# Patient Record
Sex: MALE | Race: WHITE | NOT HISPANIC OR LATINO | URBAN - METROPOLITAN AREA
[De-identification: names, ages, dates, MRNs, and addresses within clinical notes are randomized per-mention and may not be internally consistent; named-entity substitution may affect disease eponyms.]

---

## 2020-11-28 ENCOUNTER — EMERGENCY (EMERGENCY)
Facility: HOSPITAL | Age: 85
LOS: 1 days | Discharge: HOME | End: 2020-11-28
Attending: EMERGENCY MEDICINE
Payer: MEDICARE

## 2020-11-28 VITALS
OXYGEN SATURATION: 95 % | TEMPERATURE: 99 F | SYSTOLIC BLOOD PRESSURE: 138 MMHG | DIASTOLIC BLOOD PRESSURE: 66 MMHG | RESPIRATION RATE: 18 BRPM | HEART RATE: 78 BPM

## 2020-11-28 VITALS
RESPIRATION RATE: 20 BRPM | HEART RATE: 81 BPM | SYSTOLIC BLOOD PRESSURE: 122 MMHG | DIASTOLIC BLOOD PRESSURE: 70 MMHG | TEMPERATURE: 102 F | OXYGEN SATURATION: 95 %

## 2020-11-28 LAB
ALBUMIN SERPL ELPH-MCNC: 3.4 G/DL — SIGNIFICANT CHANGE UP (ref 3.3–5.2)
ALP SERPL-CCNC: 39 U/L — LOW (ref 40–120)
ALT FLD-CCNC: 25 U/L — SIGNIFICANT CHANGE UP
ANION GAP SERPL CALC-SCNC: 12 MMOL/L — SIGNIFICANT CHANGE UP (ref 5–17)
ANISOCYTOSIS BLD QL: SLIGHT — SIGNIFICANT CHANGE UP
APPEARANCE UR: CLEAR — SIGNIFICANT CHANGE UP
APTT BLD: 32.3 SEC — SIGNIFICANT CHANGE UP (ref 27.5–35.5)
AST SERPL-CCNC: 46 U/L — HIGH
B PERT DNA SPEC QL NAA+PROBE: SIGNIFICANT CHANGE UP
BACTERIA # UR AUTO: NEGATIVE — SIGNIFICANT CHANGE UP
BASOPHILS # BLD AUTO: 0.02 K/UL — SIGNIFICANT CHANGE UP (ref 0–0.2)
BASOPHILS NFR BLD AUTO: 0.9 % — SIGNIFICANT CHANGE UP (ref 0–2)
BILIRUB SERPL-MCNC: 0.3 MG/DL — LOW (ref 0.4–2)
BILIRUB UR-MCNC: NEGATIVE — SIGNIFICANT CHANGE UP
BUN SERPL-MCNC: 27 MG/DL — HIGH (ref 8–20)
C PNEUM DNA SPEC QL NAA+PROBE: SIGNIFICANT CHANGE UP
CALCIUM SERPL-MCNC: 8.4 MG/DL — LOW (ref 8.6–10.2)
CHLORIDE SERPL-SCNC: 102 MMOL/L — SIGNIFICANT CHANGE UP (ref 98–107)
CO2 SERPL-SCNC: 23 MMOL/L — SIGNIFICANT CHANGE UP (ref 22–29)
COLOR SPEC: YELLOW — SIGNIFICANT CHANGE UP
CREAT SERPL-MCNC: 0.94 MG/DL — SIGNIFICANT CHANGE UP (ref 0.5–1.3)
DIFF PNL FLD: ABNORMAL
ELLIPTOCYTES BLD QL SMEAR: SLIGHT — SIGNIFICANT CHANGE UP
EOSINOPHIL # BLD AUTO: 0 K/UL — SIGNIFICANT CHANGE UP (ref 0–0.5)
EOSINOPHIL NFR BLD AUTO: 0 % — SIGNIFICANT CHANGE UP (ref 0–6)
EPI CELLS # UR: SIGNIFICANT CHANGE UP
FLUAV H1 2009 PAND RNA SPEC QL NAA+PROBE: SIGNIFICANT CHANGE UP
FLUAV H1 RNA SPEC QL NAA+PROBE: SIGNIFICANT CHANGE UP
FLUAV H3 RNA SPEC QL NAA+PROBE: SIGNIFICANT CHANGE UP
FLUAV SUBTYP SPEC NAA+PROBE: SIGNIFICANT CHANGE UP
FLUBV RNA SPEC QL NAA+PROBE: SIGNIFICANT CHANGE UP
GIANT PLATELETS BLD QL SMEAR: PRESENT — SIGNIFICANT CHANGE UP
GLUCOSE SERPL-MCNC: 114 MG/DL — HIGH (ref 70–99)
GLUCOSE UR QL: NEGATIVE MG/DL — SIGNIFICANT CHANGE UP
HADV DNA SPEC QL NAA+PROBE: SIGNIFICANT CHANGE UP
HCOV PNL SPEC NAA+PROBE: SIGNIFICANT CHANGE UP
HCT VFR BLD CALC: 42 % — SIGNIFICANT CHANGE UP (ref 39–50)
HGB BLD-MCNC: 13.8 G/DL — SIGNIFICANT CHANGE UP (ref 13–17)
HMPV RNA SPEC QL NAA+PROBE: SIGNIFICANT CHANGE UP
HPIV1 RNA SPEC QL NAA+PROBE: SIGNIFICANT CHANGE UP
HPIV2 RNA SPEC QL NAA+PROBE: SIGNIFICANT CHANGE UP
HPIV3 RNA SPEC QL NAA+PROBE: SIGNIFICANT CHANGE UP
HPIV4 RNA SPEC QL NAA+PROBE: SIGNIFICANT CHANGE UP
INR BLD: 1.15 RATIO — SIGNIFICANT CHANGE UP (ref 0.88–1.16)
KETONES UR-MCNC: ABNORMAL
LACTATE BLDV-MCNC: 1.3 MMOL/L — SIGNIFICANT CHANGE UP (ref 0.5–2)
LEUKOCYTE ESTERASE UR-ACNC: ABNORMAL
LYMPHOCYTES # BLD AUTO: 0.46 K/UL — LOW (ref 1–3.3)
LYMPHOCYTES # BLD AUTO: 17.5 % — SIGNIFICANT CHANGE UP (ref 13–44)
MANUAL SMEAR VERIFICATION: SIGNIFICANT CHANGE UP
MCHC RBC-ENTMCNC: 28.2 PG — SIGNIFICANT CHANGE UP (ref 27–34)
MCHC RBC-ENTMCNC: 32.9 GM/DL — SIGNIFICANT CHANGE UP (ref 32–36)
MCV RBC AUTO: 85.9 FL — SIGNIFICANT CHANGE UP (ref 80–100)
MONOCYTES # BLD AUTO: 0.25 K/UL — SIGNIFICANT CHANGE UP (ref 0–0.9)
MONOCYTES NFR BLD AUTO: 9.6 % — SIGNIFICANT CHANGE UP (ref 2–14)
MYELOCYTES NFR BLD: 0.9 % — HIGH (ref 0–0)
NEUTROPHILS # BLD AUTO: 1.74 K/UL — LOW (ref 1.8–7.4)
NEUTROPHILS NFR BLD AUTO: 64.9 % — SIGNIFICANT CHANGE UP (ref 43–77)
NEUTS BAND # BLD: 0.9 % — SIGNIFICANT CHANGE UP (ref 0–8)
NITRITE UR-MCNC: NEGATIVE — SIGNIFICANT CHANGE UP
OVALOCYTES BLD QL SMEAR: SLIGHT — SIGNIFICANT CHANGE UP
PH UR: 5 — SIGNIFICANT CHANGE UP (ref 5–8)
PLAT MORPH BLD: NORMAL — SIGNIFICANT CHANGE UP
PLATELET # BLD AUTO: 88 K/UL — LOW (ref 150–400)
POIKILOCYTOSIS BLD QL AUTO: SLIGHT — SIGNIFICANT CHANGE UP
POTASSIUM SERPL-MCNC: 3.8 MMOL/L — SIGNIFICANT CHANGE UP (ref 3.5–5.3)
POTASSIUM SERPL-SCNC: 3.8 MMOL/L — SIGNIFICANT CHANGE UP (ref 3.5–5.3)
PROT SERPL-MCNC: 6.4 G/DL — LOW (ref 6.6–8.7)
PROT UR-MCNC: 100 MG/DL
PROTHROM AB SERPL-ACNC: 13.3 SEC — SIGNIFICANT CHANGE UP (ref 10.6–13.6)
RAPID RVP RESULT: DETECTED
RBC # BLD: 4.89 M/UL — SIGNIFICANT CHANGE UP (ref 4.2–5.8)
RBC # FLD: 13.2 % — SIGNIFICANT CHANGE UP (ref 10.3–14.5)
RBC BLD AUTO: ABNORMAL
RBC CASTS # UR COMP ASSIST: ABNORMAL /HPF (ref 0–4)
RV+EV RNA SPEC QL NAA+PROBE: SIGNIFICANT CHANGE UP
SARS-COV-2 RNA SPEC QL NAA+PROBE: DETECTED
SCHISTOCYTES BLD QL AUTO: SLIGHT — SIGNIFICANT CHANGE UP
SODIUM SERPL-SCNC: 137 MMOL/L — SIGNIFICANT CHANGE UP (ref 135–145)
SP GR SPEC: 1.02 — SIGNIFICANT CHANGE UP (ref 1.01–1.02)
UROBILINOGEN FLD QL: NEGATIVE MG/DL — SIGNIFICANT CHANGE UP
VARIANT LYMPHS # BLD: 5.3 % — SIGNIFICANT CHANGE UP (ref 0–6)
WBC # BLD: 2.65 K/UL — LOW (ref 3.8–10.5)
WBC # FLD AUTO: 2.65 K/UL — LOW (ref 3.8–10.5)
WBC UR QL: SIGNIFICANT CHANGE UP

## 2020-11-28 PROCEDURE — 93010 ELECTROCARDIOGRAM REPORT: CPT

## 2020-11-28 PROCEDURE — 85730 THROMBOPLASTIN TIME PARTIAL: CPT

## 2020-11-28 PROCEDURE — 70450 CT HEAD/BRAIN W/O DYE: CPT | Mod: 26

## 2020-11-28 PROCEDURE — 81001 URINALYSIS AUTO W/SCOPE: CPT

## 2020-11-28 PROCEDURE — 99284 EMERGENCY DEPT VISIT MOD MDM: CPT | Mod: 25

## 2020-11-28 PROCEDURE — 85610 PROTHROMBIN TIME: CPT

## 2020-11-28 PROCEDURE — 71045 X-RAY EXAM CHEST 1 VIEW: CPT

## 2020-11-28 PROCEDURE — 83605 ASSAY OF LACTIC ACID: CPT

## 2020-11-28 PROCEDURE — 99285 EMERGENCY DEPT VISIT HI MDM: CPT

## 2020-11-28 PROCEDURE — 93005 ELECTROCARDIOGRAM TRACING: CPT

## 2020-11-28 PROCEDURE — 82553 CREATINE MB FRACTION: CPT

## 2020-11-28 PROCEDURE — 80053 COMPREHEN METABOLIC PANEL: CPT

## 2020-11-28 PROCEDURE — 87040 BLOOD CULTURE FOR BACTERIA: CPT

## 2020-11-28 PROCEDURE — 70450 CT HEAD/BRAIN W/O DYE: CPT

## 2020-11-28 PROCEDURE — 85025 COMPLETE CBC W/AUTO DIFF WBC: CPT

## 2020-11-28 PROCEDURE — 36415 COLL VENOUS BLD VENIPUNCTURE: CPT

## 2020-11-28 PROCEDURE — 0225U NFCT DS DNA&RNA 21 SARSCOV2: CPT

## 2020-11-28 PROCEDURE — 87086 URINE CULTURE/COLONY COUNT: CPT

## 2020-11-28 PROCEDURE — 82550 ASSAY OF CK (CPK): CPT

## 2020-11-28 PROCEDURE — 71045 X-RAY EXAM CHEST 1 VIEW: CPT | Mod: 26

## 2020-11-28 RX ORDER — ACETAMINOPHEN 500 MG
975 TABLET ORAL ONCE
Refills: 0 | Status: COMPLETED | OUTPATIENT
Start: 2020-11-28 | End: 2020-11-28

## 2020-11-28 RX ORDER — SODIUM CHLORIDE 9 MG/ML
2400 INJECTION, SOLUTION INTRAVENOUS ONCE
Refills: 0 | Status: COMPLETED | OUTPATIENT
Start: 2020-11-28 | End: 2020-11-28

## 2020-11-28 RX ADMIN — Medication 975 MILLIGRAM(S): at 00:34

## 2020-11-28 RX ADMIN — SODIUM CHLORIDE 2400 MILLILITER(S): 9 INJECTION, SOLUTION INTRAVENOUS at 10:30

## 2020-11-28 NOTE — ED PROVIDER NOTE - PATIENT PORTAL LINK FT
You can access the FollowMyHealth Patient Portal offered by [facility] by registering at the following website: [email or web address]. By joining [facility] FollowMyHealth portal, you will also be able to view your health information using other applications (apps) compatible with our system.

## 2020-11-28 NOTE — ED PROVIDER NOTE - NSFOLLOWUPINSTRUCTIONS_ED_ALL_ED_FT
COVID-19 (Coronavirus Disease 2019)    WHAT YOU NEED TO KNOW:    COVID-19 is the disease caused by the novel (new) coronavirus first discovered in December 2019. Coronaviruses generally cause upper respiratory (nose, throat, and lung) infections, such as a cold. The new virus can also cause serious lower respiratory conditions, such as pneumonia or acute respiratory distress syndrome (ARDS). Anyone can develop serious problems from the new virus, but your risk is higher if you are 65 or older. A weak immune system, diabetes, or a heart or lung condition can also increase your risk.    DISCHARGE INSTRUCTIONS:    If you think you or someone you know may be infected: Do the following to protect others:   •If emergency care is needed, tell the  about the possible infection, or call ahead and tell the emergency department.      •Call a healthcare provider for instructions if symptoms are mild. Anyone who may be infected should not arrive without calling first. The provider will need to protect staff members and other patients.      •The person who may be infected needs to wear a face covering while getting medical care. This will help lower the risk of infecting others. Coverings are not used for anyone who is younger than 2 years, has breathing problems, or cannot remove it. The provider can give you instructions for anyone who cannot wear a covering.      Call your local emergency number (911 in the [place]) or go to the emergency department if:   •You have trouble breathing or shortness of breath at rest.      •You have chest pain or pressure that lasts longer than 5 minutes.      •You become confused or hard to wake.      •Your lips or face are blue.      •You have a fever of 104°F (40°C) or higher.      Call your doctor if:   •You do not have symptoms of COVID-19 but had close physical contact within 14 days with someone who tested positive.      •You have questions or concerns about your condition or care.      Medicines: You may need any of the following:   •Decongestants help reduce nasal congestion and help you breathe more easily. If you take decongestant pills, they may make you feel restless or cause problems with your sleep. Do not use decongestant sprays for more than a few days.      •Cough suppressants help reduce coughing. Ask your healthcare provider which type of cough medicine is best for you.      •Acetaminophen decreases pain and fever. It is available without a doctor's order. Ask how much to take and how often to take it. Follow directions. Read the labels of all other medicines you are using to see if they also contain acetaminophen, or ask your doctor or pharmacist. Acetaminophen can cause liver damage if not taken correctly. Do not use more than 4 grams (4,000 milligrams) total of acetaminophen in one day.       •NSAIDs, such as ibuprofen, help decrease swelling, pain, and fever. NSAIDs can cause stomach bleeding or kidney problems in certain people. If you take blood thinner medicine, always ask your healthcare provider if NSAIDs are safe for you. Always read the medicine label and follow directions.      •Take your medicine as directed. Contact your healthcare provider if you think your medicine is not helping or if you have side effects. Tell him or her if you are allergic to any medicine. Keep a list of the medicines, vitamins, and herbs you take. Include the amounts, and when and why you take them. Bring the list or the pill bottles to follow-up visits. Carry your medicine list with you in case of an emergency.      How the 2019 coronavirus spreads: The virus spreads quickly and easily. You can become infected if you are in contact with a large amount of the virus, even for a short time. You can also become infected by being around a small amount of virus for a long time. The following are ways the virus is thought to spread, but more information may be coming:   •Droplets are the most common way all coronaviruses spread. The virus can travel in droplets that form when a person talks, coughs, or sneezes. Anyone who breathes in the droplets or gets them in his or her eyes can become infected with the virus. Close personal contact with an infected person is thought to be the main way the virus spreads. Close personal contact means you are within 6 feet (2 meters) of the person.      •Person-to-person contact can spread the virus. For example, a person with the virus on his or her hands can spread it by shaking hands with someone. At this time, it does not appear that the virus can be passed to a baby during pregnancy or delivery. The baby can be infected after he or she is born through person-to-person contact. The virus also does not appear to spread in breast milk. If you are pregnant or breastfeeding, talk to your healthcare provider or obstetrician about any concerns you have.      •The virus can stay on objects and surfaces. A person can get the virus on his or her hands by touching the object or surface. Infection happens if the person then touches his or her eyes or mouth with unwashed hands. It is not yet known how long the virus can stay on an object or surface. That is why it is important to clean all surfaces that are used regularly.      •An infected animal may be able to infect a person who touches it. This may happen at live markets or on a farm.      How everyone can lower the risk for COVID-19: The best way to prevent infection is to avoid anyone who is infected, but this can be hard to do. An infected person can spread the virus before signs or symptoms begin, or even if signs or symptoms never develop. The following can help lower the risk for infection:     Limit the Spread of Infectious Disease     •Wash your hands often throughout the day. Use soap and water. Rub your soapy hands together, lacing your fingers. Wash the front and back of each hand, and in between your fingers. Use the fingers of one hand to scrub under the fingernails of the other hand. Wash for at least 20 seconds. Rinse with warm, running water for several seconds. Then dry your hands with a clean towel or paper towel. Use hand  that contains alcohol if soap and water are not available. Do not touch your eyes, nose, or mouth without washing your hands first. Teach children how to wash their hands and use hand .  Handwashing           •Cover a sneeze or cough. This prevents droplets from traveling from you to others. Turn your face away and cover your mouth and nose with a tissue. Throw the tissue away. Use the bend of your arm if a tissue is not available. Then wash your hands well with soap and water or use hand . Turn and cover your face if you are around someone who is sneezing or coughing. Teach children how to cover a cough or sneeze.      •Follow worldwide, national, and local social distancing guidelines. Social distancing means people avoid close physical contact so the virus cannot spread from one person to another. Keep at least 6 feet (2 meters) between you and others. Also keep this distance from anyone who comes to your home, such as someone making a delivery.      •Make a habit of not touching your face. It is not known how long the virus can stay on objects and surfaces. If you get the virus on your hands, you can transfer it to your eyes, nose, or mouth and become infected. You can also transfer it to objects, surfaces, or people. Be aware of what you touch when you go out. Examples include handrails and elevator buttons. Try not to touch anything with bare hands unless it is necessary. Wash your hands before you leave your home and when you return.      •Clean and disinfect high-touch surfaces and objects often. Use a disinfecting solution or wipes. You can make a solution by diluting 4 teaspoons of bleach in 1 quart (4 cups) of water. Clean and disinfect even if you think no one living in or coming to your home is infected with the virus. You can wipe items with a disinfecting cloth before you bring them into your home. Wash your hands after you handle anything you bring into your home.      •Make your immune system as healthy as possible. A weakened immune system makes you more vulnerable to the new coronavirus. No COVID-19 vaccine is available yet. Vaccines such as the flu and pneumonia vaccines can help your immune system. Your healthcare provider can tell you which vaccines to get, and when to get them. Keep your immune system as strong as possible. Do not smoke. Eat healthy foods, exercise regularly, and try to manage stress. Go to bed and wake up at the same times each day.   Healthy Foods           Social distancing guidelines: National and local social distancing rules vary. Rules may change over time as restrictions are lifted. Restrictions may return if an outbreak happens where you live. It is important to know and follow all current social distancing rules in your area. The following are general guidelines:  •Limit trips out of your home. You may be able to have food, medicines, and other supplies delivered. If possible, have delivered items left at your door or other area. Try not to have someone hand you an item. You will be so close to the person that the virus can spread between you.      •Do not have close physical contact with anyone who does not live in your home. Do not shake hands with, hug, or kiss a person as a greeting. Stand or walk as far from others as possible. If you must use public transportation (such as a bus or subway), try to sit or stand away from others. You can stay safely connected with others through phone calls, e-mail messages, social media websites, and video chats. Check in on anyone who may be having a hard time socially distancing, or who lives alone. Ask administrators at nursing homes or long-term care facilities how you can safely communicate with someone living there.      •Wear a cloth face covering around others who do not live in your home. Face coverings help prevent the virus from spreading to others in droplets. You can use a clear face covering if someone needs to read your lips. This is a cloth covering that has plastic over the mouth area so your lips can be seen. Do not use coverings that have breathing valves or vents. The virus can travel out of the valve or vent and be spread to others. Do not take your covering off to talk, cough, or sneeze. Do not use coverings on children younger than 2 years or on anyone who has breathing problems or cannot remove it.      •Only allow medical or other necessary professionals into your home. Wear your face covering, and remind professionals to wear a face covering. Remind them to wash their hands when they arrive and before they leave. Do not let anyone who does not live in your home in, even if the person is not sick. A person can pass the virus to others before symptoms of COVID-19 begin. Some people never even develop symptoms. Children commonly have mild symptoms or no symptoms. It may be hard to tell a child not to hug or kiss you. Explain that this is how he or she can help you stay healthy.      •Do not go to someone else's home unless it is necessary. Do not go over to visit, even if the person is lonely. Only go if you need to help him or her. Make sure you both wear face coverings while you are there.      •Avoid large gatherings and crowds. Gatherings or crowds of 10 or more individuals can cause the virus to spread. Examples of gatherings include parties, sporting events, [employer] services, and conferences. Crowds may form at beaches, huang, and tourist attractions. Protect yourself by staying away from large gatherings and crowds.      •Ask your healthcare provider for other ways to have appointments. You may be able to have appointments without having to go into the provider's office. Some providers offer phone, video, or other types of appointments. You may also be able to get prescriptions for a few months of your medicines at a time.      •Stay safe if you must go out to work. You may have a job that can only be done outside your home. Keep physical distance between you and other workers as much as possible. Follow your employer's rules so everyone stays safe.      If you have COVID-19 and are recovering at home: Healthcare providers will give you specific instructions to follow. The following are general guidelines to remind you how to keep others safe until you are well:   •Wash your hands often. Use soap and water as much as possible. You can use hand  that contains alcohol if soap and water are not available. Do not share towels with anyone. If you use paper towels, throw them away in a lined trash can kept in your room or area. Use a covered trash can, if possible.      •Do not go out of your home unless it is necessary. You may have to go to your healthcare provider's office for check-ups or to get prescription refills. Do not arrive at the provider's office without an appointment. Providers have to make their offices safe for staff and other patients.      •Do not have close physical contact with anyone unless it is necessary. Only have close physical contact with a person giving direct care, or a baby or child you must care for. Family members and friends should not visit you. If possible, stay in a separate area or room of your home if you live with others. No one should go into the area or room except to give you care. You can visit with others by phone, video chat, e-mail, or similar systems. It is important to stay connected with others in your life while you recover.      •Wear a face covering while others are near you. This can help prevent droplets from spreading the virus when you talk, sneeze, or cough. Put the covering on before anyone comes into your room or area. Remind the person to cover his or her nose and mouth before going in to provide care for you.      •Do not share items. Do not share dishes, towels, or other items with anyone. Items need to be washed after you use them.      •Protect your baby. Wash your hands with soap and water often throughout the day. Wear a clean face covering while you breastfeed, or while you express or pump breast milk. If possible, ask someone who is well to care for your baby. You can put breast milk in bottles for the person to use, if needed. Talk to your healthcare provider if you have any questions or concerns about caring for or bonding with your baby. He or she will tell you when to bring your baby in for check-ups and vaccines. He or she will also tell you what to do if you think your baby was infected with the new virus.      •Do not handle live animals. Until more is known, it is best not to touch, play with, or handle live animals. Some animals, including pets, have been infected with the new coronavirus. Do not handle or care for animals until you are well. Care includes feeding, petting, and cuddling your pet. Do not let your pet lick you or share your food. Ask someone who is not infected to take care of your pet, if possible. If you must care for a pet, wear a face covering. Wash your hands before and after you give care.      •Follow directions from your healthcare provider for being around others after you recover. You will need to wait at least 10 days after symptoms first appeared. Then you will need to have no fever for 24 hours without fever medicine, and no other symptoms. A loss of taste or smell may continue for several months. It is considered okay to be around others if this is your only symptom. It is not known for sure if or for how long a recovered person can pass the virus to others. Your provider may give you instructions, such as continuing social distancing or wearing a face covering around others.      How to take care of someone who has COVID-19: If the person lives in another home, arrange for a time to give care. Remember to bring a few pairs of disposable gloves and a cloth face covering. The following are general guidelines to help you safely care for anyone who has COVID-19:  •Wash your hands often. Wash before and after you go into the person's home, area, or room. Throw paper towels away in a lined trash can that has a lid, if possible.      •Do not allow others to go near the person. No one should come into the person's home unless it is necessary. If possible, the person should be in a separate area or room if he or she lives with others. Keep the room's door shut unless you need to go in or out. Have others call, video chat, or e-mail the person if he or she is feeling well enough. The person may feel lonely if he or she is kept separate for a long period of time. Safe communication can help him or her stay connected to family and friends.      •Make sure the person's room has good air flow. You may be able to open the window if the weather allows. An air conditioner can also be turned on to help air move.      •Contact the person before you go in to give care. Make sure the person is wearing a face covering. Remind him or her to wash his or her hands with soap and water. He or she can use hand  that contains alcohol if soap and water are not available. Put on a face covering before you go in to give care.      •Wear gloves while you give care and clean. Clean items the person uses often. Clean countertops, cooking surfaces, and the fronts and insides of the microwave and refrigerator. Clean the shower, toilet, the area around the toilet, the sink, the area around the sink, and faucets. Gather used laundry or bedding. Wash and dry items on the warmest settings the fabric allows. Wash dishes and silverware in hot, soapy water or in a .      •Anything you throw away needs to go into a lined trash can. When you need to empty the trash, close the open end of the lining and tie it closed. This helps prevent items the virus is on from spilling out of the trash. Remove your gloves and throw them away. Wash your hands.      Follow up with your doctor as directed: Write down your questions so you remember to ask them during your visits.

## 2020-11-28 NOTE — ED ADULT TRIAGE NOTE - CHIEF COMPLAINT QUOTE
pt BIBA from home c/o generalized weakness, body aches, and decreased oral intake x 1 week. Pts son denies any known medical history. Pt lives in home with covid + patient was due to get tested today. Denies any chest pain, SOB, cough

## 2020-11-28 NOTE — ED PROVIDER NOTE - OBJECTIVE STATEMENT
Son, Fidel - [phone] who provides most history  89 yo male denies PMHx p/w fever and malaise for several days; as per son, patient fell a few days ago, he found him down after several hours; since then, has had increasing malaise, intermittent confusion, poor appetite, sleeping "a lot"; denies fever; +sick contact with COVID in house; patient denies cough, SOB, or abd pain; just "everything hurts."

## 2020-11-28 NOTE — ED PROVIDER NOTE - PROGRESS NOTE DETAILS
all results reviewed; patient with unremarkable head CT and labs; clear xray; covid positive, but with normal sats; even with exertion walkking in hospital room, sats never dropped below 93% on Room Air; discussed with patient's son, ok for d/c home with him, he is home from work for another 8 days and can care for dad at home; advised on all appropriate PPE and mask use and isolation at home; precautions discussed for return; has O2 monitor available at home

## 2020-11-28 NOTE — ED ADULT NURSE REASSESSMENT NOTE - NS ED NURSE REASSESS COMMENT FT1
Son in the  emergency room waiting room , pt dressed up , IV removed - bleeding controlled, pt wheeled to waiting room , discharge instructions given to son, all questions answered, pt in not any distress at this time

## 2020-11-29 LAB
CULTURE RESULTS: SIGNIFICANT CHANGE UP
SPECIMEN SOURCE: SIGNIFICANT CHANGE UP

## 2020-12-02 ENCOUNTER — INPATIENT (INPATIENT)
Facility: HOSPITAL | Age: 85
LOS: 15 days | Discharge: HOME | DRG: 177 | End: 2020-12-18
Attending: HOSPITALIST | Admitting: INTERNAL MEDICINE
Payer: MEDICARE

## 2020-12-02 VITALS
WEIGHT: 175.05 LBS | RESPIRATION RATE: 32 BRPM | SYSTOLIC BLOOD PRESSURE: 137 MMHG | OXYGEN SATURATION: 89 % | DIASTOLIC BLOOD PRESSURE: 71 MMHG | HEIGHT: 72 IN | HEART RATE: 74 BPM

## 2020-12-02 DIAGNOSIS — U07.1 COVID-19: ICD-10-CM

## 2020-12-02 DIAGNOSIS — Z85.46 PERSONAL HISTORY OF MALIGNANT NEOPLASM OF PROSTATE: Chronic | ICD-10-CM

## 2020-12-02 LAB
ACANTHOCYTES BLD QL SMEAR: SIGNIFICANT CHANGE UP
ALBUMIN SERPL ELPH-MCNC: 3.1 G/DL — LOW (ref 3.3–5.2)
ALBUMIN SERPL ELPH-MCNC: 3.1 G/DL — LOW (ref 3.3–5.2)
ALP SERPL-CCNC: 38 U/L — LOW (ref 40–120)
ALP SERPL-CCNC: 43 U/L — SIGNIFICANT CHANGE UP (ref 40–120)
ALT FLD-CCNC: 42 U/L — HIGH
ALT FLD-CCNC: 44 U/L — HIGH
ANION GAP SERPL CALC-SCNC: 12 MMOL/L — SIGNIFICANT CHANGE UP (ref 5–17)
AST SERPL-CCNC: 77 U/L — HIGH
AST SERPL-CCNC: 85 U/L — HIGH
BASE EXCESS BLDA CALC-SCNC: 1.2 MMOL/L — SIGNIFICANT CHANGE UP (ref -2–2)
BASOPHILS # BLD AUTO: 0 K/UL — SIGNIFICANT CHANGE UP (ref 0–0.2)
BASOPHILS NFR BLD AUTO: 0 % — SIGNIFICANT CHANGE UP (ref 0–2)
BILIRUB DIRECT SERPL-MCNC: 0.2 MG/DL — SIGNIFICANT CHANGE UP (ref 0–0.3)
BILIRUB INDIRECT FLD-MCNC: 0.3 MG/DL — SIGNIFICANT CHANGE UP (ref 0.2–1)
BILIRUB SERPL-MCNC: 0.5 MG/DL — SIGNIFICANT CHANGE UP (ref 0.4–2)
BILIRUB SERPL-MCNC: 0.5 MG/DL — SIGNIFICANT CHANGE UP (ref 0.4–2)
BLD GP AB SCN SERPL QL: SIGNIFICANT CHANGE UP
BLOOD GAS COMMENTS ARTERIAL: SIGNIFICANT CHANGE UP
BUN SERPL-MCNC: 25 MG/DL — HIGH (ref 8–20)
CALCIUM SERPL-MCNC: 8.4 MG/DL — LOW (ref 8.6–10.2)
CHLORIDE SERPL-SCNC: 97 MMOL/L — LOW (ref 98–107)
CO2 SERPL-SCNC: 24 MMOL/L — SIGNIFICANT CHANGE UP (ref 22–29)
CREAT SERPL-MCNC: 0.82 MG/DL — SIGNIFICANT CHANGE UP (ref 0.5–1.3)
CREAT SERPL-MCNC: 0.85 MG/DL — SIGNIFICANT CHANGE UP (ref 0.5–1.3)
CRP SERPL-MCNC: 7.39 MG/DL — HIGH (ref 0–0.4)
CRP SERPL-MCNC: 9.45 MG/DL — HIGH (ref 0–0.4)
D DIMER BLD IA.RAPID-MCNC: 267 NG/ML DDU — HIGH
ELLIPTOCYTES BLD QL SMEAR: SLIGHT — SIGNIFICANT CHANGE UP
EOSINOPHIL # BLD AUTO: 0.09 K/UL — SIGNIFICANT CHANGE UP (ref 0–0.5)
EOSINOPHIL NFR BLD AUTO: 1.7 % — SIGNIFICANT CHANGE UP (ref 0–6)
FERRITIN SERPL-MCNC: 649 NG/ML — HIGH (ref 30–400)
FERRITIN SERPL-MCNC: 710 NG/ML — HIGH (ref 30–400)
GAS PNL BLDA: SIGNIFICANT CHANGE UP
GIANT PLATELETS BLD QL SMEAR: PRESENT — SIGNIFICANT CHANGE UP
GLUCOSE SERPL-MCNC: 104 MG/DL — HIGH (ref 70–99)
HCO3 BLDA-SCNC: 25 MMOL/L — SIGNIFICANT CHANGE UP (ref 20–26)
HCT VFR BLD CALC: 43.6 % — SIGNIFICANT CHANGE UP (ref 39–50)
HGB BLD-MCNC: 14.8 G/DL — SIGNIFICANT CHANGE UP (ref 13–17)
HOROWITZ INDEX BLDA+IHG-RTO: SIGNIFICANT CHANGE UP
LDH SERPL L TO P-CCNC: 547 U/L — HIGH (ref 98–192)
LYMPHOCYTES # BLD AUTO: 0.31 K/UL — LOW (ref 1–3.3)
LYMPHOCYTES # BLD AUTO: 6.1 % — LOW (ref 13–44)
MANUAL SMEAR VERIFICATION: SIGNIFICANT CHANGE UP
MCHC RBC-ENTMCNC: 28.3 PG — SIGNIFICANT CHANGE UP (ref 27–34)
MCHC RBC-ENTMCNC: 33.9 GM/DL — SIGNIFICANT CHANGE UP (ref 32–36)
MCV RBC AUTO: 83.4 FL — SIGNIFICANT CHANGE UP (ref 80–100)
MONOCYTES # BLD AUTO: 0.36 K/UL — SIGNIFICANT CHANGE UP (ref 0–0.9)
MONOCYTES NFR BLD AUTO: 7 % — SIGNIFICANT CHANGE UP (ref 2–14)
NEUTROPHILS # BLD AUTO: 4.4 K/UL — SIGNIFICANT CHANGE UP (ref 1.8–7.4)
NEUTROPHILS NFR BLD AUTO: 85.2 % — HIGH (ref 43–77)
OVALOCYTES BLD QL SMEAR: SLIGHT — SIGNIFICANT CHANGE UP
PCO2 BLDA: 31 MMHG — LOW (ref 35–45)
PH BLDA: 7.5 — HIGH (ref 7.35–7.45)
PLAT MORPH BLD: ABNORMAL
PLATELET # BLD AUTO: 113 K/UL — LOW (ref 150–400)
PLATELET COUNT - ESTIMATE: ABNORMAL
PO2 BLDA: 60 MMHG — LOW (ref 83–108)
POIKILOCYTOSIS BLD QL AUTO: SIGNIFICANT CHANGE UP
POTASSIUM SERPL-MCNC: 3.9 MMOL/L — SIGNIFICANT CHANGE UP (ref 3.5–5.3)
POTASSIUM SERPL-SCNC: 3.9 MMOL/L — SIGNIFICANT CHANGE UP (ref 3.5–5.3)
PROCALCITONIN SERPL-MCNC: 0.13 NG/ML — HIGH (ref 0.02–0.1)
PROT SERPL-MCNC: 6.2 G/DL — LOW (ref 6.6–8.7)
PROT SERPL-MCNC: 6.3 G/DL — LOW (ref 6.6–8.7)
RBC # BLD: 5.23 M/UL — SIGNIFICANT CHANGE UP (ref 4.2–5.8)
RBC # FLD: 12.8 % — SIGNIFICANT CHANGE UP (ref 10.3–14.5)
RBC BLD AUTO: ABNORMAL
SAO2 % BLDA: 92 % — LOW (ref 95–99)
SARS-COV-2 RNA SPEC QL NAA+PROBE: DETECTED
SMUDGE CELLS # BLD: PRESENT — SIGNIFICANT CHANGE UP
SODIUM SERPL-SCNC: 133 MMOL/L — LOW (ref 135–145)
TROPONIN T SERPL-MCNC: <0.01 NG/ML — SIGNIFICANT CHANGE UP (ref 0–0.06)
WBC # BLD: 5.16 K/UL — SIGNIFICANT CHANGE UP (ref 3.8–10.5)
WBC # FLD AUTO: 5.16 K/UL — SIGNIFICANT CHANGE UP (ref 3.8–10.5)

## 2020-12-02 PROCEDURE — 93010 ELECTROCARDIOGRAM REPORT: CPT

## 2020-12-02 PROCEDURE — 99223 1ST HOSP IP/OBS HIGH 75: CPT | Mod: AI

## 2020-12-02 PROCEDURE — 99223 1ST HOSP IP/OBS HIGH 75: CPT

## 2020-12-02 PROCEDURE — 71045 X-RAY EXAM CHEST 1 VIEW: CPT | Mod: 26

## 2020-12-02 PROCEDURE — 99291 CRITICAL CARE FIRST HOUR: CPT

## 2020-12-02 RX ORDER — ENOXAPARIN SODIUM 100 MG/ML
40 INJECTION SUBCUTANEOUS EVERY 12 HOURS
Refills: 0 | Status: DISCONTINUED | OUTPATIENT
Start: 2020-12-02 | End: 2020-12-12

## 2020-12-02 RX ORDER — DEXAMETHASONE 0.5 MG/5ML
6 ELIXIR ORAL DAILY
Refills: 0 | Status: COMPLETED | OUTPATIENT
Start: 2020-12-03 | End: 2020-12-12

## 2020-12-02 RX ORDER — REMDESIVIR 5 MG/ML
200 INJECTION INTRAVENOUS EVERY 24 HOURS
Refills: 0 | Status: COMPLETED | OUTPATIENT
Start: 2020-12-02 | End: 2020-12-02

## 2020-12-02 RX ORDER — DEXAMETHASONE 0.5 MG/5ML
10 ELIXIR ORAL ONCE
Refills: 0 | Status: COMPLETED | OUTPATIENT
Start: 2020-12-02 | End: 2020-12-02

## 2020-12-02 RX ORDER — CHOLECALCIFEROL (VITAMIN D3) 125 MCG
2000 CAPSULE ORAL DAILY
Refills: 0 | Status: DISCONTINUED | OUTPATIENT
Start: 2020-12-02 | End: 2020-12-18

## 2020-12-02 RX ORDER — REMDESIVIR 5 MG/ML
INJECTION INTRAVENOUS
Refills: 0 | Status: COMPLETED | OUTPATIENT
Start: 2020-12-06 | End: 2020-12-06

## 2020-12-02 RX ORDER — ZINC SULFATE TAB 220 MG (50 MG ZINC EQUIVALENT) 220 (50 ZN) MG
220 TAB ORAL DAILY
Refills: 0 | Status: DISCONTINUED | OUTPATIENT
Start: 2020-12-02 | End: 2020-12-11

## 2020-12-02 RX ORDER — ALBUTEROL 90 UG/1
2 AEROSOL, METERED ORAL EVERY 6 HOURS
Refills: 0 | Status: DISCONTINUED | OUTPATIENT
Start: 2020-12-02 | End: 2020-12-18

## 2020-12-02 RX ORDER — ASCORBIC ACID 60 MG
500 TABLET,CHEWABLE ORAL EVERY 12 HOURS
Refills: 0 | Status: DISCONTINUED | OUTPATIENT
Start: 2020-12-02 | End: 2020-12-18

## 2020-12-02 RX ORDER — REMDESIVIR 5 MG/ML
100 INJECTION INTRAVENOUS EVERY 24 HOURS
Refills: 0 | Status: COMPLETED | OUTPATIENT
Start: 2020-12-03 | End: 2020-12-06

## 2020-12-02 RX ORDER — DEXAMETHASONE 0.5 MG/5ML
10 ELIXIR ORAL ONCE
Refills: 0 | Status: DISCONTINUED | OUTPATIENT
Start: 2020-12-02 | End: 2020-12-02

## 2020-12-02 RX ADMIN — ALBUTEROL 2 PUFF(S): 90 AEROSOL, METERED ORAL at 14:59

## 2020-12-02 RX ADMIN — Medication 500 MILLIGRAM(S): at 17:42

## 2020-12-02 RX ADMIN — Medication 102 MILLIGRAM(S): at 08:24

## 2020-12-02 RX ADMIN — ALBUTEROL 2 PUFF(S): 90 AEROSOL, METERED ORAL at 22:21

## 2020-12-02 RX ADMIN — ENOXAPARIN SODIUM 40 MILLIGRAM(S): 100 INJECTION SUBCUTANEOUS at 17:42

## 2020-12-02 RX ADMIN — Medication 110 MILLIGRAM(S): at 09:55

## 2020-12-02 RX ADMIN — Medication 10 MILLIGRAM(S): at 09:00

## 2020-12-02 RX ADMIN — REMDESIVIR 200 MILLIGRAM(S): 5 INJECTION INTRAVENOUS at 14:57

## 2020-12-02 RX ADMIN — ZINC SULFATE TAB 220 MG (50 MG ZINC EQUIVALENT) 220 MILLIGRAM(S): 220 (50 ZN) TAB at 11:38

## 2020-12-02 RX ADMIN — Medication 2000 UNIT(S): at 11:38

## 2020-12-02 NOTE — ED ADULT NURSE NOTE - INTERVENTIONS DEFINITIONS
Instruct patient to call for assistance/Room bathroom lighting operational/Non-slip footwear when patient is off stretcher/Stretcher in lowest position, wheels locked, appropriate side rails in place/Call bell, personal items and telephone within reach

## 2020-12-02 NOTE — H&P ADULT - NSHPPHYSICALEXAM_GEN_ALL_CORE
PHYSICAL EXAM:  Vital Signs Last 24 Hrs  T(C): 36.6 (02 Dec 2020 08:14), Max: 36.6 (02 Dec 2020 08:14)  T(F): 97.9 (02 Dec 2020 08:14), Max: 97.9 (02 Dec 2020 08:14)  HR: 76 (02 Dec 2020 09:55) (74 - 76)  BP: 133/72 (02 Dec 2020 09:55) (133/72 - 137/71)  BP(mean): --  RR: 28 (02 Dec 2020 09:55) (28 - 32)  SpO2: 94% (02 Dec 2020 09:55) (89% - 95%)    GENERAL: NAD, well-groomed, well-developed  HEAD:  Atraumatic, Normocephalic  EYES: EOMI, PERRLA, conjunctiva and sclera clear  ENMT: No tonsillar erythema, exudates, or enlargement; Moist mucous membranes  NERVOUS SYSTEM:  Motor Strength 5/5 B/L upper and lower extremities, sensory intact  CHEST/LUNG:  bilaterally rales & rhonchi  HEART: Regular rate and rhythm; No murmurs  ABDOMEN: Soft, Nontender, Nondistended; Bowel sounds present  EXTREMITIES:  2+ Peripheral Pulses, No clubbing, cyanosis, or edema

## 2020-12-02 NOTE — H&P ADULT - HISTORY OF PRESENT ILLNESS
89 year old male presents with SOB and STINSON getting worsening of symptoms after getting COVID-19. Pt in ED hypoxic on room air now on High flow oxygen. Pt currently a little confused difficult to obtain a hx. Pt admitted for COVID 19 PNA. 89 year old male hx of prostate cancer s/p resection, on no home meds presents with SOB and STINSON getting worsening of symptoms after getting COVID-19. Pt in ED hypoxic on room air now on High flow oxygen. Pt currently a little confused difficult to obtain a hx. Pt admitted for COVID 19 PNA.

## 2020-12-02 NOTE — ED PROVIDER NOTE - CLINICAL SUMMARY MEDICAL DECISION MAKING FREE TEXT BOX
The patient presents with SOB and cough with hypoxemia due to COVID19 pneumonia and will admit for further evaluation

## 2020-12-02 NOTE — ED PROVIDER NOTE - OBJECTIVE STATEMENT
The patient is a 89 year old male presents with SOB and STINSON getting worsening of symptoms after getting COVID-19  Generalized weakness  No CP, No abd pain, No motor No sensory loss

## 2020-12-02 NOTE — H&P ADULT - ASSESSMENT
89 year old male presents with SOB and STINSON getting worsening of symptoms after getting COVID-19. Pt in ED hypoxic on room air now on High flow oxygen. Pt currently a little confused difficult to obtain a hx. Pt admitted for COVID 19 PNA.    1) Acute Hypoxic Respiratory Failure due to COVID 19 PNA  - admit to tele pulse ox  - start dexamethasone IV daily  - ID Dr. Prescott consulted to initiate remdesevir   - O2/high flow  - zinc, vitamin C and D  - albuterol Inh  - pulmonary Dr. Patiño consulted     2) Prophylactic measure  - DVT ppx: Lovenox SQ 89 year old male hx of prostate cancer s/p resection, on no home meds presents with SOB and STINSON getting worsening of symptoms after getting COVID-19. Pt in ED hypoxic on room air now on High flow oxygen. Pt currently a little confused difficult to obtain a hx. Pt admitted for COVID 19 PNA.    1) Acute Hypoxic Respiratory Failure due to COVID 19 PNA  - Admit to tele pulse ox  - start dexamethasone IV daily  - ID Dr. Prescott consulted to [facility] for possible remdesevir   - O2/high flow  - zinc, vitamin C and D  - albuterol Inh  - pulmonary Dr. Patiño consulted     2) Prophylactic measure  - DVT ppx: Lovenox SQ    Discussed above plan with is son Fidel High and answered all his questions. He agrees with above plan and pt is Full Code per his son.

## 2020-12-02 NOTE — ED ADULT TRIAGE NOTE - CHIEF COMPLAINT QUOTE
Pt brought in by ambulance from home for eval of worsening SOB and AMS. Pt found to have room air O2 sat of 88% upon EMS arrival at home. Pt tachypneic and slightly labored at present.

## 2020-12-02 NOTE — H&P ADULT - NSHPLABSRESULTS_GEN_ALL_CORE
14.8   5.16  )-----------( 113      ( 02 Dec 2020 08:36 )             43.6       12-02    133<L>  |  97<L>  |  25.0<H>  ----------------------------<  104<H>  3.9   |  24.0  |  0.85    Ca    8.4<L>      02 Dec 2020 08:36    TPro  6.2<L>  /  Alb  3.1<L>  /  TBili  0.5  /  DBili  x   /  AST  85<H>  /  ALT  42<H>  /  AlkPhos  38<L>  12-02        < from: Xray Chest 1 View-PORTABLE IMMEDIATE (12.02.20 @ 09:18) >    FINDINGS:    Single frontal view of the chest demonstrates diffuse bilateral infiltrates. The cardiomediastinal silhouette is enlarged. No acute osseous abnormalities. Overlying EKG leads and wires are noted    IMPRESSION: Multilobar pneumonia.    SUGAR FLYNN MD; Attending Radiologist  This document has been electronically signed. Dec  2 2020  9:23AM    < end of copied text >

## 2020-12-02 NOTE — ED ADULT NURSE NOTE - OBJECTIVE STATEMENT
pt alert and oriented x2, poor historian, comes in c/o SOB. pt covid+ from 11/28. RR even labored, 90% on 3 LNC, respiratory therapy at bedside placing patient on high flow oxygen. pt on cardiac monitor. pt denies pain. pt placed on isolation. pt educated on plan of care, pt able to successfully teach back plan of care to RN, RN will continue to reeducate pt during hospital stay.

## 2020-12-03 LAB
ALBUMIN SERPL ELPH-MCNC: 2.9 G/DL — LOW (ref 3.3–5.2)
ALBUMIN SERPL ELPH-MCNC: 3 G/DL — LOW (ref 3.3–5.2)
ALP SERPL-CCNC: 38 U/L — LOW (ref 40–120)
ALP SERPL-CCNC: 41 U/L — SIGNIFICANT CHANGE UP (ref 40–120)
ALT FLD-CCNC: 39 U/L — SIGNIFICANT CHANGE UP
ALT FLD-CCNC: 42 U/L — HIGH
ANION GAP SERPL CALC-SCNC: 12 MMOL/L — SIGNIFICANT CHANGE UP (ref 5–17)
AST SERPL-CCNC: 67 U/L — HIGH
AST SERPL-CCNC: 73 U/L — HIGH
BILIRUB DIRECT SERPL-MCNC: 0.1 MG/DL — SIGNIFICANT CHANGE UP (ref 0–0.3)
BILIRUB INDIRECT FLD-MCNC: 0.5 MG/DL — SIGNIFICANT CHANGE UP (ref 0.2–1)
BILIRUB SERPL-MCNC: 0.6 MG/DL — SIGNIFICANT CHANGE UP (ref 0.4–2)
BILIRUB SERPL-MCNC: 0.6 MG/DL — SIGNIFICANT CHANGE UP (ref 0.4–2)
BUN SERPL-MCNC: 30 MG/DL — HIGH (ref 8–20)
CALCIUM SERPL-MCNC: 8.6 MG/DL — SIGNIFICANT CHANGE UP (ref 8.6–10.2)
CHLORIDE SERPL-SCNC: 101 MMOL/L — SIGNIFICANT CHANGE UP (ref 98–107)
CO2 SERPL-SCNC: 21 MMOL/L — LOW (ref 22–29)
CREAT SERPL-MCNC: 0.59 MG/DL — SIGNIFICANT CHANGE UP (ref 0.5–1.3)
CULTURE RESULTS: SIGNIFICANT CHANGE UP
CULTURE RESULTS: SIGNIFICANT CHANGE UP
GLUCOSE BLDC GLUCOMTR-MCNC: 130 MG/DL — HIGH (ref 70–99)
GLUCOSE SERPL-MCNC: 121 MG/DL — HIGH (ref 70–99)
HCT VFR BLD CALC: 45.5 % — SIGNIFICANT CHANGE UP (ref 39–50)
HGB BLD-MCNC: 15.5 G/DL — SIGNIFICANT CHANGE UP (ref 13–17)
MAGNESIUM SERPL-MCNC: 2.3 MG/DL — SIGNIFICANT CHANGE UP (ref 1.6–2.6)
MCHC RBC-ENTMCNC: 28.4 PG — SIGNIFICANT CHANGE UP (ref 27–34)
MCHC RBC-ENTMCNC: 34.1 GM/DL — SIGNIFICANT CHANGE UP (ref 32–36)
MCV RBC AUTO: 83.5 FL — SIGNIFICANT CHANGE UP (ref 80–100)
PLATELET # BLD AUTO: 123 K/UL — LOW (ref 150–400)
POTASSIUM SERPL-MCNC: 4.7 MMOL/L — SIGNIFICANT CHANGE UP (ref 3.5–5.3)
POTASSIUM SERPL-SCNC: 4.7 MMOL/L — SIGNIFICANT CHANGE UP (ref 3.5–5.3)
PROT SERPL-MCNC: 6 G/DL — LOW (ref 6.6–8.7)
PROT SERPL-MCNC: 6 G/DL — LOW (ref 6.6–8.7)
RBC # BLD: 5.45 M/UL — SIGNIFICANT CHANGE UP (ref 4.2–5.8)
RBC # FLD: 12.8 % — SIGNIFICANT CHANGE UP (ref 10.3–14.5)
SODIUM SERPL-SCNC: 134 MMOL/L — LOW (ref 135–145)
SPECIMEN SOURCE: SIGNIFICANT CHANGE UP
SPECIMEN SOURCE: SIGNIFICANT CHANGE UP
WBC # BLD: 5.84 K/UL — SIGNIFICANT CHANGE UP (ref 3.8–10.5)
WBC # FLD AUTO: 5.84 K/UL — SIGNIFICANT CHANGE UP (ref 3.8–10.5)

## 2020-12-03 PROCEDURE — 99233 SBSQ HOSP IP/OBS HIGH 50: CPT

## 2020-12-03 PROCEDURE — 99497 ADVNCD CARE PLAN 30 MIN: CPT

## 2020-12-03 PROCEDURE — 99232 SBSQ HOSP IP/OBS MODERATE 35: CPT

## 2020-12-03 RX ADMIN — Medication 0.5 MILLIGRAM(S): at 14:00

## 2020-12-03 RX ADMIN — Medication 2000 UNIT(S): at 17:09

## 2020-12-03 RX ADMIN — ENOXAPARIN SODIUM 40 MILLIGRAM(S): 100 INJECTION SUBCUTANEOUS at 17:08

## 2020-12-03 RX ADMIN — ENOXAPARIN SODIUM 40 MILLIGRAM(S): 100 INJECTION SUBCUTANEOUS at 07:17

## 2020-12-03 RX ADMIN — ALBUTEROL 2 PUFF(S): 90 AEROSOL, METERED ORAL at 16:46

## 2020-12-03 RX ADMIN — ZINC SULFATE TAB 220 MG (50 MG ZINC EQUIVALENT) 220 MILLIGRAM(S): 220 (50 ZN) TAB at 17:07

## 2020-12-03 RX ADMIN — Medication 6 MILLIGRAM(S): at 07:17

## 2020-12-03 RX ADMIN — Medication 500 MILLIGRAM(S): at 17:08

## 2020-12-03 RX ADMIN — REMDESIVIR 500 MILLIGRAM(S): 5 INJECTION INTRAVENOUS at 14:55

## 2020-12-03 RX ADMIN — ALBUTEROL 2 PUFF(S): 90 AEROSOL, METERED ORAL at 10:45

## 2020-12-03 NOTE — PROGRESS NOTE ADULT - SUBJECTIVE AND OBJECTIVE BOX
PULMONARY PROGRESS NOTE      VITALIY AMAYA-[id number]    Patient is a 89y old  Male who presents with a chief complaint of COVID PNA (03 Dec 2020 10:53)      INTERVAL HPI/OVERNIGHT EVENTS: Prone. On HFO2. Answering questions. Awake, alert. NAD.     MEDICATIONS  (STANDING):  ALBUTerol    90 MICROgram(s) HFA Inhaler 2 Puff(s) Inhalation every 6 hours  ascorbic acid 500 milliGRAM(s) Oral every 12 hours  cholecalciferol 2000 Unit(s) Oral daily  dexAMETHasone  Injectable 6 milliGRAM(s) IV Push daily  enoxaparin Injectable 40 milliGRAM(s) SubCutaneous every 12 hours  LORazepam   Injectable 0.5 milliGRAM(s) IV Push once  remdesivir  IVPB   IV Intermittent   remdesivir  IVPB 100 milliGRAM(s) IV Intermittent every 24 hours  zinc sulfate 220 milliGRAM(s) Oral daily      MEDICATIONS  (PRN):      Allergies    No Known Allergies    Intolerances        PAST MEDICAL & SURGICAL HISTORY:  Cancer of prostate  s.p resection    H/O prostate cancer  s/p resection                Vital Signs Last 24 Hrs  T(C): 36.6 (03 Dec 2020 10:58), Max: 37.1 (02 Dec 2020 23:51)  T(F): 97.8 (03 Dec 2020 10:58), Max: 98.7 (02 Dec 2020 23:51)  HR: 68 (03 Dec 2020 10:58) (68 - 97)  BP: 115/63 (03 Dec 2020 10:58) (115/63 - 150/67)  BP(mean): --  RR: 18 (03 Dec 2020 10:58) (18 - 25)  SpO2: 95% (03 Dec 2020 10:58) (91% - 96%)    PHYSICAL EXAMINATION:    GENERAL: The patient is awake and alert in no apparent distress.     HEENT: Head is normocephalic and atraumatic.  ucous membranes are moist.         NEUROLOGIC: Grossly intact.    LABS:                        15.5   5.84  )-----------( 123      ( 03 Dec 2020 06:36 )             45.5     12-03    134<L>  |  101  |  30.0<H>  ----------------------------<  121<H>  4.7   |  21.0<L>  |  0.59    Ca    8.6      03 Dec 2020 06:36  Mg     2.3     12-03    TPro  6.0<L>  /  Alb  3.0<L>  /  TBili  0.6  /  DBili  0.1  /  AST  73<H>  /  ALT  42<H>  /  AlkPhos  38<L>  12-03        ABG - ( 02 Dec 2020 14:55 )  pH, Arterial: 7.50  pH, Blood: x     /  pCO2: 31    /  pO2: 60    / HCO3: 25    / Base Excess: 1.2   /  SaO2: 92               CARDIAC MARKERS ( 02 Dec 2020 08:36 )  x     / <0.01 ng/mL / x     / x     / x          D-Dimer Assay, Quantitative: 267 ng/mL DDU (12-02-20 @ 18:30)        Procalcitonin, Serum: 0.13 ng/mL (12-02-20 @ 08:36)      MICROBIOLOGY:  < from: Xray Chest 1 View-PORTABLE IMMEDIATE (12.02.20 @ 09:18) >     EXAM:  XR CHEST PORTABLE IMMED 1V                          PROCEDURE DATE:  12/02/2020          INTERPRETATION:  TECHNIQUE: Single portable view of the chest.    COMPARISON: 11/28/2000    CLINICAL HISTORY: Shortness of Breath, Cough,  Fever    FINDINGS:    Single frontal view of the chest demonstrates diffuse bilateral infiltrates. The cardiomediastinal silhouette is enlarged. No acute osseous abnormalities. Overlying EKG leads and wires are noted    IMPRESSION: Multilobar pneumonia.            SUGAR FLYNN MD; Attending Radiologist  This document has been electronically signed. Dec  2 2020  9:23AM    < end of copied text >      RADIOLOGY & ADDITIONAL STUDIES:

## 2020-12-03 NOTE — PROGRESS NOTE ADULT - CONVERSATION DETAILS
discussed diagnosis, prognosis, status, says pt would want all aggressive, life-saving measures including CPR and/or intubation, if needed. pt to remain full code.

## 2020-12-03 NOTE — PROGRESS NOTE ADULT - ASSESSMENT
89y/oM PMH prostate CA s/p resection not on home medications presenting with worsening SOB. Known COVID-19 infection. Seen in [facility] ER on 11/28.     Acute hypoxic respiratory failure 2/2 COVID-19 PNA  Transaminitis  Thrombocytopenia   -seen in ER 11/28, found to be COVID+, c/o worsening confusion, poor appetite, +COVID contact   -returns with worsening SOB, in ER found to be 88% on RA, placed on supplemental O2  -cont dexamethasone 6mg qd  -ID following  -Cont remdesivir   -pulm recs appreciated   -pt seen on HFNC (60%) with NRB    -dysphagia screen pending, if fails, will order official swallow eval   -aspirations precautions   -q6 FSG while on steroids   -f/u hgbA1c     DVT ppx: lovenox sq    Fidel javier [phone], updated 89y/oM PMH prostate CA s/p resection not on home medications presenting with worsening SOB. Known COVID-19 infection. Seen in [facility] ER on 11/28.     Acute hypoxic respiratory failure 2/2 COVID-19 PNA  Transaminitis  Thrombocytopenia   -seen in ER 11/28, found to be COVID+, c/o worsening confusion, poor appetite, +COVID contact   -returns with worsening SOB, in ER found to be 88% on RA, placed on supplemental O2  -cont dexamethasone 6mg qd  -ID following  -Cont remdesivir   -pulm recs appreciated   -pt seen on HFNC (60%) with NRB    -dysphagia screen pending, if fails, will order official swallow eval   -aspirations precautions   -q6 FSG while on steroids   -f/u hgbA1c     DVT ppx: lovenox sq    Prognosis, guarded   dispo: pending clinical improvement, poss 4-5 days    Fidel javier [phone], updated

## 2020-12-03 NOTE — PROGRESS NOTE ADULT - SUBJECTIVE AND OBJECTIVE BOX
FELIPE AMAYA    [id number]    89y      Male    CC: SOB    INTERVAL HPI/OVERNIGHT EVENTS: Pt seen and examined. No complaints. confused. d/w son, Fidel, pt with confusion at baseline but definitely more pronounced last few days. says pt previously lived alone until about 3 months ago but that he is usually home alone all day without problems. recently started using a walker.     REVIEW OF SYSTEMS:    CONSTITUTIONAL: No fever  RESPIRATORY: No cough, wheezing, hemoptysis  CARDIOVASCULAR: No chest pain, palpitations  GASTROINTESTINAL: No abdominal or epigastric pain. No nausea, vomiting  NEUROLOGICAL: No headaches    Vital Signs Last 24 Hrs  T(C): 36.7 (03 Dec 2020 05:21), Max: 37.1 (02 Dec 2020 23:51)  T(F): 98 (03 Dec 2020 05:21), Max: 98.7 (02 Dec 2020 23:51)  HR: 70 (03 Dec 2020 05:21) (70 - 87)  BP: 142/75 (03 Dec 2020 05:21) (126/58 - 150/67)  BP(mean): --  RR: 18 (03 Dec 2020 05:21) (18 - 25)  SpO2: 96% (03 Dec 2020 10:00) (91% - 96%)    PHYSICAL EXAM:    GENERAL: NAD  HEENT: +EOMI  NECK: soft, supple  CHEST/LUNG: respirations non-labored on HFNC with NRB  HEART: S1S2+, Regular rate and rhythm  ABDOMEN: Soft, Nontender, Nondistended; Bowel sounds present  SKIN: warm, dry  NEURO: AAOX1, oriented to self  PSYCH: normal affect    LABS:                        15.5   5.84  )-----------( 123      ( 03 Dec 2020 06:36 )             45.5     12-03    134<L>  |  101  |  30.0<H>  ----------------------------<  121<H>  4.7   |  21.0<L>  |  0.59    Ca    8.6      03 Dec 2020 06:36  Mg     2.3     12-03    TPro  6.0<L>  /  Alb  3.0<L>  /  TBili  0.6  /  DBili  0.1  /  AST  73<H>  /  ALT  42<H>  /  AlkPhos  38<L>  12-03            MEDICATIONS  (STANDING):  ALBUTerol    90 MICROgram(s) HFA Inhaler 2 Puff(s) Inhalation every 6 hours  ascorbic acid 500 milliGRAM(s) Oral every 12 hours  cholecalciferol 2000 Unit(s) Oral daily  dexAMETHasone  Injectable 6 milliGRAM(s) IV Push daily  enoxaparin Injectable 40 milliGRAM(s) SubCutaneous every 12 hours  remdesivir  IVPB   IV Intermittent   remdesivir  IVPB 100 milliGRAM(s) IV Intermittent every 24 hours  zinc sulfate 220 milliGRAM(s) Oral daily    MEDICATIONS  (PRN):      RADIOLOGY & ADDITIONAL TESTS:

## 2020-12-03 NOTE — PROGRESS NOTE ADULT - ASSESSMENT
-COVID 19 pna  -Hypoxic resp failure; see above  -Delerium  -D-dimer WNL when adjusted for age but other inflammatory markers increasing    RECC:  Decadron. ABG. Continue BID lovenox for now. ID eval for remdesivir.  Prone. Titrate O2. Follow lytes.     Prognosis very guarded. ICU eval if any worsening as family requesting all aggressive care.

## 2020-12-03 NOTE — PROGRESS NOTE ADULT - ASSESSMENT
89 year old male hx of prostate cancer s/p resection, on no home meds presents with SOB and STINSON getting worsening of symptoms after getting COVID-19. Pt in ED hypoxic on room air now on High flow oxygen.     COVID 19 + infection  Viral pneumonia  Acute hypoxic respiratory failure  transaminitis  Leukopenia  Thrombocytopenia      - currently on hi jaden O2  - c/w remdesivir  - decadron 6 mg daily while on remdesivir  - PCT low, inflammatory markers elevated  - Avoid antibiotics unless there is a concern for a bacterial infection  - VTE prophylaxis per current [facility] COVID 19 protocol  - Check CBC with diff, CMP with LFT's daily,  ESR, CRP, Ferritin, LDH,  procalcitonin,  D dimer q48h  - Encourage self proning q2h and ambulation as tolerated  - Taper off O2 as tolerated- once tolerating room air would ideally monitor for 24h prior to discharge.  - Inpatient Contact/Airborne isolation           Will follow

## 2020-12-03 NOTE — PROGRESS NOTE ADULT - SUBJECTIVE AND OBJECTIVE BOX
[facility]  INFECTIOUS DISEASES AND INTERNAL MEDICINE at [place]  =======================================================  Yang Prescott MD  Diplomates American Board of Internal Medicine and Infectious Diseases  Tel: [phone]      Fax: [phone]  =======================================================    FELIPE AMAYA [id number]    Follow up: covid 19  on hi jaden  "feeling lousy"    Allergies:  No Known Allergies      Medications:  ALBUTerol    90 MICROgram(s) HFA Inhaler 2 Puff(s) Inhalation every 6 hours  ascorbic acid 500 milliGRAM(s) Oral every 12 hours  cholecalciferol 2000 Unit(s) Oral daily  dexAMETHasone  Injectable 6 milliGRAM(s) IV Push daily  enoxaparin Injectable 40 milliGRAM(s) SubCutaneous every 12 hours  remdesivir  IVPB   IV Intermittent   remdesivir  IVPB 100 milliGRAM(s) IV Intermittent every 24 hours  zinc sulfate 220 milliGRAM(s) Oral daily            REVIEW OF SYSTEMS:  CONSTITUTIONAL:  No Fever or chills  HEENT:   No diplopia or blurred vision.  No earache, sore throat or runny nose.  CARDIOVASCULAR:  No pressure, squeezing, strangling, tightness, heaviness or aching about the chest, neck, axilla or epigastrium.  RESPIRATORY:  No cough, shortness of breath  GASTROINTESTINAL:  No nausea, vomiting or diarrhea.  GENITOURINARY:  No dysuria, frequency or urgency. No Blood in urine  MUSCULOSKELETAL:  no joint aches, no muscle pain  SKIN:  No change in skin, hair or nails.  NEUROLOGIC:  No Headaches, seizures or weakness.  PSYCHIATRIC:  No disorder of thought or mood.  ENDOCRINE:  No heat or cold intolerance  HEMATOLOGICAL:  No easy bruising or bleeding.            Physical Exam:  ICU Vital Signs Last 24 Hrs  T(C): 36.6 (03 Dec 2020 10:58), Max: 37.1 (02 Dec 2020 23:51)  T(F): 97.8 (03 Dec 2020 10:58), Max: 98.7 (02 Dec 2020 23:51)  HR: 68 (03 Dec 2020 10:58) (68 - 97)  BP: 115/63 (03 Dec 2020 10:58) (115/63 - 150/67)  BP(mean): --  ABP: --  ABP(mean): --  RR: 18 (03 Dec 2020 10:58) (18 - 25)  SpO2: 95% (03 Dec 2020 10:58) (91% - 96%)      GEN: NAD, on hi jaden  HEENT: normocephalic and atraumatic. EOMI. PERRL.  Anicteric   NECK: Supple.   LUNGS: Crackles to auscultation.  HEART: Regular rate and rhythm without murmur.  ABDOMEN: Soft, nontender, and nondistended.  Positive bowel sounds.    : No CVA tenderness  EXTREMITIES: Without any edema.  MSK: no joint swelling  NEUROLOGIC: Cranial nerves II through XII are grossly intact. No focal deficits  PSYCHIATRIC: Appropriate affect .  SKIN: No Rash      Labs:                        15.5   5.84  )-----------( 123      ( 03 Dec 2020 06:36 )             45.5   12-03    134<L>  |  101  |  30.0<H>  ----------------------------<  121<H>  4.7   |  21.0<L>  |  0.59    Ca    8.6      03 Dec 2020 06:36  Mg     2.3     12-03    TPro  6.0<L>  /  Alb  3.0<L>  /  TBili  0.6  /  DBili  0.1  /  AST  73<H>  /  ALT  42<H>  /  AlkPhos  38<L>  12-03

## 2020-12-04 LAB
A1C WITH ESTIMATED AVERAGE GLUCOSE RESULT: 5.9 % — HIGH (ref 4–5.6)
ALBUMIN SERPL ELPH-MCNC: 2.9 G/DL — LOW (ref 3.3–5.2)
ALBUMIN SERPL ELPH-MCNC: 3 G/DL — LOW (ref 3.3–5.2)
ALP SERPL-CCNC: 38 U/L — LOW (ref 40–120)
ALP SERPL-CCNC: 39 U/L — LOW (ref 40–120)
ALT FLD-CCNC: 42 U/L — HIGH
ALT FLD-CCNC: 44 U/L — HIGH
ANION GAP SERPL CALC-SCNC: 12 MMOL/L — SIGNIFICANT CHANGE UP (ref 5–17)
ANISOCYTOSIS BLD QL: SLIGHT — SIGNIFICANT CHANGE UP
AST SERPL-CCNC: 66 U/L — HIGH
AST SERPL-CCNC: 66 U/L — HIGH
BASOPHILS # BLD AUTO: 0 K/UL — SIGNIFICANT CHANGE UP (ref 0–0.2)
BASOPHILS NFR BLD AUTO: 0 % — SIGNIFICANT CHANGE UP (ref 0–2)
BILIRUB DIRECT SERPL-MCNC: 0.2 MG/DL — SIGNIFICANT CHANGE UP (ref 0–0.3)
BILIRUB INDIRECT FLD-MCNC: 0.4 MG/DL — SIGNIFICANT CHANGE UP (ref 0.2–1)
BILIRUB SERPL-MCNC: 0.6 MG/DL — SIGNIFICANT CHANGE UP (ref 0.4–2)
BILIRUB SERPL-MCNC: 0.6 MG/DL — SIGNIFICANT CHANGE UP (ref 0.4–2)
BUN SERPL-MCNC: 41 MG/DL — HIGH (ref 8–20)
BURR CELLS BLD QL SMEAR: PRESENT — SIGNIFICANT CHANGE UP
CALCIUM SERPL-MCNC: 8.5 MG/DL — LOW (ref 8.6–10.2)
CHLORIDE SERPL-SCNC: 104 MMOL/L — SIGNIFICANT CHANGE UP (ref 98–107)
CO2 SERPL-SCNC: 22 MMOL/L — SIGNIFICANT CHANGE UP (ref 22–29)
CREAT SERPL-MCNC: 0.68 MG/DL — SIGNIFICANT CHANGE UP (ref 0.5–1.3)
CRP SERPL-MCNC: 4.2 MG/DL — HIGH (ref 0–0.4)
EOSINOPHIL # BLD AUTO: 0 K/UL — SIGNIFICANT CHANGE UP (ref 0–0.5)
EOSINOPHIL NFR BLD AUTO: 0 % — SIGNIFICANT CHANGE UP (ref 0–6)
ERYTHROCYTE [SEDIMENTATION RATE] IN BLOOD: 20 MM/HR — SIGNIFICANT CHANGE UP (ref 0–20)
ESTIMATED AVERAGE GLUCOSE: 123 MG/DL — HIGH (ref 68–114)
FERRITIN SERPL-MCNC: 665 NG/ML — HIGH (ref 30–400)
GLUCOSE BLDC GLUCOMTR-MCNC: 104 MG/DL — HIGH (ref 70–99)
GLUCOSE BLDC GLUCOMTR-MCNC: 121 MG/DL — HIGH (ref 70–99)
GLUCOSE BLDC GLUCOMTR-MCNC: 131 MG/DL — HIGH (ref 70–99)
GLUCOSE BLDC GLUCOMTR-MCNC: 135 MG/DL — HIGH (ref 70–99)
GLUCOSE BLDC GLUCOMTR-MCNC: 139 MG/DL — HIGH (ref 70–99)
GLUCOSE BLDC GLUCOMTR-MCNC: 147 MG/DL — HIGH (ref 70–99)
GLUCOSE SERPL-MCNC: 130 MG/DL — HIGH (ref 70–99)
HCT VFR BLD CALC: 42.6 % — SIGNIFICANT CHANGE UP (ref 39–50)
HGB BLD-MCNC: 14.2 G/DL — SIGNIFICANT CHANGE UP (ref 13–17)
LDH SERPL L TO P-CCNC: 424 U/L — HIGH (ref 98–192)
LYMPHOCYTES # BLD AUTO: 0.59 K/UL — LOW (ref 1–3.3)
LYMPHOCYTES # BLD AUTO: 6.1 % — LOW (ref 13–44)
MACROCYTES BLD QL: SLIGHT — SIGNIFICANT CHANGE UP
MAGNESIUM SERPL-MCNC: 2.3 MG/DL — SIGNIFICANT CHANGE UP (ref 1.6–2.6)
MANUAL SMEAR VERIFICATION: SIGNIFICANT CHANGE UP
MCHC RBC-ENTMCNC: 28 PG — SIGNIFICANT CHANGE UP (ref 27–34)
MCHC RBC-ENTMCNC: 33.3 GM/DL — SIGNIFICANT CHANGE UP (ref 32–36)
MCV RBC AUTO: 83.9 FL — SIGNIFICANT CHANGE UP (ref 80–100)
MONOCYTES # BLD AUTO: 0.93 K/UL — HIGH (ref 0–0.9)
MONOCYTES NFR BLD AUTO: 9.7 % — SIGNIFICANT CHANGE UP (ref 2–14)
NEUTROPHILS # BLD AUTO: 8.11 K/UL — HIGH (ref 1.8–7.4)
NEUTROPHILS NFR BLD AUTO: 84.2 % — HIGH (ref 43–77)
PLAT MORPH BLD: ABNORMAL
PLATELET # BLD AUTO: 193 K/UL — SIGNIFICANT CHANGE UP (ref 150–400)
POIKILOCYTOSIS BLD QL AUTO: SIGNIFICANT CHANGE UP
POLYCHROMASIA BLD QL SMEAR: SLIGHT — SIGNIFICANT CHANGE UP
POTASSIUM SERPL-MCNC: 3.5 MMOL/L — SIGNIFICANT CHANGE UP (ref 3.5–5.3)
POTASSIUM SERPL-SCNC: 3.5 MMOL/L — SIGNIFICANT CHANGE UP (ref 3.5–5.3)
PROCALCITONIN SERPL-MCNC: 0.14 NG/ML — HIGH (ref 0.02–0.1)
PROT SERPL-MCNC: 5.7 G/DL — LOW (ref 6.6–8.7)
PROT SERPL-MCNC: 6 G/DL — LOW (ref 6.6–8.7)
RBC # BLD: 5.08 M/UL — SIGNIFICANT CHANGE UP (ref 4.2–5.8)
RBC # FLD: 12.8 % — SIGNIFICANT CHANGE UP (ref 10.3–14.5)
RBC BLD AUTO: ABNORMAL
SARS-COV-2 IGG SERPL QL IA: NEGATIVE — SIGNIFICANT CHANGE UP
SARS-COV-2 IGM SERPL IA-ACNC: 0.1 INDEX — SIGNIFICANT CHANGE UP
SODIUM SERPL-SCNC: 138 MMOL/L — SIGNIFICANT CHANGE UP (ref 135–145)
WBC # BLD: 9.63 K/UL — SIGNIFICANT CHANGE UP (ref 3.8–10.5)
WBC # FLD AUTO: 9.63 K/UL — SIGNIFICANT CHANGE UP (ref 3.8–10.5)

## 2020-12-04 PROCEDURE — 99233 SBSQ HOSP IP/OBS HIGH 50: CPT

## 2020-12-04 PROCEDURE — 99232 SBSQ HOSP IP/OBS MODERATE 35: CPT

## 2020-12-04 RX ADMIN — ENOXAPARIN SODIUM 40 MILLIGRAM(S): 100 INJECTION SUBCUTANEOUS at 06:03

## 2020-12-04 RX ADMIN — REMDESIVIR 500 MILLIGRAM(S): 5 INJECTION INTRAVENOUS at 12:31

## 2020-12-04 RX ADMIN — Medication 500 MILLIGRAM(S): at 06:03

## 2020-12-04 RX ADMIN — ENOXAPARIN SODIUM 40 MILLIGRAM(S): 100 INJECTION SUBCUTANEOUS at 20:16

## 2020-12-04 RX ADMIN — ZINC SULFATE TAB 220 MG (50 MG ZINC EQUIVALENT) 220 MILLIGRAM(S): 220 (50 ZN) TAB at 12:31

## 2020-12-04 RX ADMIN — Medication 500 MILLIGRAM(S): at 17:14

## 2020-12-04 RX ADMIN — Medication 6 MILLIGRAM(S): at 06:03

## 2020-12-04 RX ADMIN — Medication 2000 UNIT(S): at 12:31

## 2020-12-04 NOTE — PROGRESS NOTE ADULT - ASSESSMENT
-COVID 19 PNA  -Hypoxic resp failure 2/2 COVID  -ARDS  -Delirium    Recommendations:  C/w steroids - Decadron 6mg. FD Lovenox. On Remdesivir. Self-proning. Titrate O2, on HFNC, maintain SpO2 >=85%. Trend inflammatory markers. Prognosis guarded. [facility].    Norberto Messina M.D.  Pulmonary & Critical Care Medicine  [facility]  Pulmonary Medicine at [place]  [address]  [place], [place] [address]  T: [phone]  F: [phone]

## 2020-12-04 NOTE — PROGRESS NOTE ADULT - SUBJECTIVE AND OBJECTIVE BOX
PULMONARY PROGRESS NOTE      FELIPE AMAYA  MRN-[id number]    Patient is a 89y old  Male who presents with a chief complaint of COVID PNA (04 Dec 2020 11:54)        INTERVAL HPI/OVERNIGHT EVENTS:  Pt seen and examined at the bedside.    MEDICATIONS  (STANDING):  ALBUTerol    90 MICROgram(s) HFA Inhaler 2 Puff(s) Inhalation every 6 hours  ascorbic acid 500 milliGRAM(s) Oral every 12 hours  cholecalciferol 2000 Unit(s) Oral daily  dexAMETHasone  Injectable 6 milliGRAM(s) IV Push daily  enoxaparin Injectable 40 milliGRAM(s) SubCutaneous every 12 hours  remdesivir  IVPB   IV Intermittent   remdesivir  IVPB 100 milliGRAM(s) IV Intermittent every 24 hours  zinc sulfate 220 milliGRAM(s) Oral daily    MEDICATIONS  (PRN):    Allergies    No Known Allergies    Intolerances      PAST MEDICAL & SURGICAL HISTORY:  Cancer of prostate  s.p resection    H/O prostate cancer  s/p resection          REVIEW OF SYSTEMS:  Negative except as noted in HPI      Vital Signs Last 24 Hrs  T(C): 36.6 (04 Dec 2020 12:25), Max: 36.8 (03 Dec 2020 23:49)  T(F): 97.8 (04 Dec 2020 12:25), Max: 98.2 (03 Dec 2020 23:49)  HR: 75 (04 Dec 2020 12:25) (72 - 81)  BP: 140/80 (04 Dec 2020 12:25) (124/68 - 156/70)  BP(mean): --  RR: 18 (04 Dec 2020 12:25) (18 - 18)  SpO2: 96% (04 Dec 2020 12:25) (91% - 96%)      PHYSICAL EXAMINATION:  GEN: Ill appearing  HEENT: NC/AT  NECK: Supple  CV: +S1, S2  RESPIRATORY: Diminished coarse breath sounds B/L, +coughing  ABDOMEN: Soft, non-tender  EXTREMITIES: No LE edema  NEURO: Grossly non-focal      LABS:                        14.2   9.63  )-----------( 193      ( 04 Dec 2020 06:23 )             42.6     12-04    138  |  104  |  41.0<H>  ----------------------------<  130<H>  3.5   |  22.0  |  0.68    Ca    8.5<L>      04 Dec 2020 06:23  Mg     2.3     12-04    TPro  5.7<L>  /  Alb  2.9<L>  /  TBili  0.6  /  DBili  0.2  /  AST  66<H>  /  ALT  42<H>  /  AlkPhos  38<L>  12-04                    Procalcitonin, Serum: 0.14 ng/mL (12-04-20 @ 06:23)  Procalcitonin, Serum: 0.13 ng/mL (12-02-20 @ 08:36)      MICROBIOLOGY:  COVID-19 PCR . (12.02.20 @ 08:36)    COVID-19 PCR: Detected: You can help in the fight against COVID-19. [facility] may contact  you to see if you are interested in voluntarily participating in one of  our clinical trials.  Testing is performed using polymerase chain reaction (PCR) or  transcription mediated amplification (TMA). This COVID-19 (SARS-CoV-2)  nucleic acid amplification test was validated by [facility] and is  in use under the FDA Emergency Use Authorization (EUA) for clinical labs  CLIA-certified to perform high complexity testing. Test results should be  correlated with clinical presentation, patient history, and epidemiology.        RADIOLOGY & ADDITIONAL STUDIES:  < from: Xray Chest 1 View-PORTABLE IMMEDIATE (12.02.20 @ 09:18) >  FINDINGS:    Single frontal view of the chest demonstrates diffuse bilateral infiltrates. The cardiomediastinal silhouette is enlarged. No acute osseous abnormalities. Overlying EKG leads and wires are noted    IMPRESSION: Multilobar pneumonia.    < end of copied text >      ECHO:

## 2020-12-04 NOTE — PROGRESS NOTE ADULT - SUBJECTIVE AND OBJECTIVE BOX
FELIPE AMAYA  89y  Male    Interval/overnight events/pt complaints:  patient seen and examined at the bedside  patient reports his breathing is better today, but does feel fatigued  Denies fevers, chills, headache, dizziness, chest pain, palpitation or abdominal pain.   BP slightly elevated this AM, but otherwise VSS.    MEDICATIONS  (STANDING):  ALBUTerol    90 MICROgram(s) HFA Inhaler 2 Puff(s) Inhalation every 6 hours  ascorbic acid 500 milliGRAM(s) Oral every 12 hours  cholecalciferol 2000 Unit(s) Oral daily  dexAMETHasone  Injectable 6 milliGRAM(s) IV Push daily  enoxaparin Injectable 40 milliGRAM(s) SubCutaneous every 12 hours  remdesivir  IVPB   IV Intermittent   remdesivir  IVPB 100 milliGRAM(s) IV Intermittent every 24 hours  zinc sulfate 220 milliGRAM(s) Oral daily    Vital Signs Last 24 Hrs  T(C): 36.5 (04 Dec 2020 04:21), Max: 36.8 (03 Dec 2020 23:49)  T(F): 97.7 (04 Dec 2020 04:21), Max: 98.2 (03 Dec 2020 23:49)  HR: 72 (04 Dec 2020 04:21) (72 - 81)  BP: 156/70 (04 Dec 2020 04:21) (124/68 - 156/70)  BP(mean): --  RR: 18 (04 Dec 2020 04:21) (18 - 18)  SpO2: 96% (04 Dec 2020 04:21) (91% - 96%)  I&O's Summary    PHYSICAL EXAM:  GENERAL: eldery male, fatigues, NAD lying on side  NECK: Supple  CHEST/LUNG: HFNC 100%; normal effort, intermittent coughing Clear to auscultation bilaterally; No rales, rhonchi, wheezing  HEART: Regular S1,S2 rate and rhythm; No murmurs, rubs, or gallops  ABDOMEN: Soft, Nontender, Nondistended; Bowel sounds present  EXTREMITIES:  2+ Peripheral Pulses, No clubbing, cyanosis, edema, calf tenderness  NEURO:  Alert, and awake oreinted to self; No Focal deficits, sensory and motor intact  SKIN: No rashes or lesions, warm, dry    CAPILLARY BLOOD GLUCOSE      POCT Blood Glucose.: 131 mg/dL (04 Dec 2020 10:36)  POCT Blood Glucose.: 121 mg/dL (04 Dec 2020 06:56)  POCT Blood Glucose.: 147 mg/dL (04 Dec 2020 00:09)  POCT Blood Glucose.: 130 mg/dL (03 Dec 2020 16:57)      LABS    (12-04 @ 06:23)                      14.2  9.63 )-----------( 193                 42.6    Neutrophils = 8.11 (84.2%)  Lymphocytes = 0.59 (6.1%)  Eosinophils = 0.00 (0.0%)  Basophils = 0.00 (0.0%)  Monocytes = 0.93 (9.7%)  Bands = --%    12-04    138  |  104  |  41.0<H>  ----------------------------<  130<H>  3.5   |  22.0  |  0.68    Ca    8.5<L>      04 Dec 2020 06:23  Mg     2.3     12-04    TPro  5.7<L>  /  Alb  2.9<L>  /  TBili  0.6  /  DBili  0.2  /  AST  66<H>  /  ALT  42<H>  /  AlkPhos  38<L>  12-04    Hepatic Function Panel (12.04.20 @ 06:23)   Indirect Reacting Bilirubin: 0.4 mg/dL   Protein Total, Serum: 6.0 g/dL   Albumin, Serum: 3.0 g/dL   Bilirubin Total, Serum: 0.6 mg/dL   Bilirubin Direct, Serum: 0.2 mg/dL   Alkaline Phosphatase, Serum: 39 U/L   Aspartate Aminotransferase (AST/SGOT): 66 U/L   Alanine Aminotransferase (ALT/SGPT): 44 U/L   Procalcitonin, Serum (12.04.20 @ 06:23)   Procalcitonin, Serum: 0.14: Reference range change as of 3/21/2019 ng/mL   Lactate Dehydrogenase, Serum: 424 U/L (12.04.20 @ 06:23)   Ferritin, Serum: 665 ng/mL (12.04.20 @ 06:23)   C-Reactive Protein, Serum: 4.20 mg/dL (12.04.20 @ 06:23)   Sedimentation Rate, Erythrocyte: 20 mm/hr (12.04.20 @ 06:23)     IMAGING    Consultant(s) Notes Reviewed:  [x ] YES  [ ] NO

## 2020-12-04 NOTE — PROGRESS NOTE ADULT - ASSESSMENT
89y/oM PMH prostate CA s/p resection not on home medications presenting with worsening SOB. Known COVID-19 infection. Seen in [facility] ER on 11/28.     Acute hypoxic respiratory failure 2/2 COVID-19 PNA  Transaminitis  Thrombocytopenia   -seen in ER 11/28, found to be COVID+, c/o worsening confusion, poor appetite, +COVID contact   -returns with worsening SOB, in ER found to be 88% on RA, placed on supplemental O2  -cont dexamethasone 6mg qd day 2/10  -ID following  -Cont remdesivir until 12/06  -pulm recs appreciated   -pt seen on HFNC (100%) this afternoon    -dysphagia screen   - patient able to swallow thin liquids without issue, however, placed on pureed diet as patient is confused and at risk for aspiration.  -aspirations precautions   -q6 FSG while on steroids   -f/u hgbA1c     DVT ppx: lovenox sq    Prognosis, guarded   dispo: will need to titrate off high supplemental O2 requirement, pending clinical improvement, poss 4-5 days 89y/oM PMH prostate CA s/p resection not on home medications presenting with worsening SOB. Known COVID-19 infection. Seen in [facility] ER on 11/28.     Acute hypoxic respiratory failure 2/2 COVID-19 PNA  Transaminitis  Thrombocytopenia   -seen in ER 11/28, found to be COVID+, c/o worsening confusion, poor appetite, +COVID contact   -returns with worsening SOB, in ER found to be 88% on RA, placed on supplemental O2  -cont dexamethasone 6mg qd day 2/10  -ID following  -Cont remdesivir until 12/06  -pulm recs appreciated   -pt seen on HFNC (100%) this afternoon    -dysphagia screen   - patient able to swallow thin liquids without issue, however, placed on pureed diet as patient is confused and at risk for aspiration.  -aspirations precautions   -q6 FSG while on steroids   -hgbA1c 5.9    DVT ppx: lovenox sq    Prognosis, guarded   dispo: will need to titrate off high supplemental O2 requirement, pending clinical improvement, poss 4-5 days

## 2020-12-04 NOTE — PROGRESS NOTE ADULT - ASSESSMENT
89 year old male hx of prostate cancer s/p resection, on no home meds presents with SOB and STINSON getting worsening of symptoms after getting COVID-19. Pt in ED hypoxic on room air now on High flow oxygen.     COVID 19 + infection  Viral pneumonia  Acute hypoxic respiratory failure  transaminitis  Leukopenia  Thrombocytopenia      - currently on hi jaden O2  - c/w remdesivir  - decadron 6 mg daily while on remdesivir  - PCT low, inflammatory markers elevated  - Avoid antibiotics unless there is a concern for a bacterial infection  - VTE prophylaxis per current [facility] COVID 19 protocol  - Check CBC with diff, CMP with LFT's daily,  ESR, CRP, Ferritin, LDH,  procalcitonin,  D dimer q48h  - if  d dimer uptrending consider venous Le dopplers and full dose AC  - Encourage self proning q2h and ambulation as tolerated  - Taper off O2 as tolerated- once tolerating room air would ideally monitor for 24h prior to discharge.  - Inpatient Contact/Airborne isolation           Will follow

## 2020-12-04 NOTE — PROGRESS NOTE ADULT - SUBJECTIVE AND OBJECTIVE BOX
[facility]  INFECTIOUS DISEASES AND INTERNAL MEDICINE at [place]  =======================================================  Yang Prescott MD  Diplomates American Board of Internal Medicine and Infectious Diseases  Tel: [phone]      Fax: [phone]  =======================================================    FELIPE AMAYA [id number]    Follow up: covid 19  on hi jaden  feels well    Allergies:  No Known Allergies      Medications:  ALBUTerol    90 MICROgram(s) HFA Inhaler 2 Puff(s) Inhalation every 6 hours  ascorbic acid 500 milliGRAM(s) Oral every 12 hours  cholecalciferol 2000 Unit(s) Oral daily  dexAMETHasone  Injectable 6 milliGRAM(s) IV Push daily  enoxaparin Injectable 40 milliGRAM(s) SubCutaneous every 12 hours  remdesivir  IVPB   IV Intermittent   remdesivir  IVPB 100 milliGRAM(s) IV Intermittent every 24 hours  zinc sulfate 220 milliGRAM(s) Oral daily            REVIEW OF SYSTEMS:  CONSTITUTIONAL:  No Fever or chills  HEENT:   No diplopia or blurred vision.  No earache, sore throat or runny nose.  CARDIOVASCULAR:  No pressure, squeezing, strangling, tightness, heaviness or aching about the chest, neck, axilla or epigastrium.  RESPIRATORY:  No cough, shortness of breath  GASTROINTESTINAL:  No nausea, vomiting or diarrhea.  GENITOURINARY:  No dysuria, frequency or urgency. No Blood in urine  MUSCULOSKELETAL:  no joint aches, no muscle pain  SKIN:  No change in skin, hair or nails.  NEUROLOGIC:  No Headaches, seizures or weakness.  PSYCHIATRIC:  No disorder of thought or mood.  ENDOCRINE:  No heat or cold intolerance  HEMATOLOGICAL:  No easy bruising or bleeding.            Physical Exam:  ICU Vital Signs Last 24 Hrs  T(C): 36.6 (03 Dec 2020 10:58), Max: 37.1 (02 Dec 2020 23:51)  T(F): 97.8 (03 Dec 2020 10:58), Max: 98.7 (02 Dec 2020 23:51)  HR: 68 (03 Dec 2020 10:58) (68 - 97)  BP: 115/63 (03 Dec 2020 10:58) (115/63 - 150/67)  BP(mean): --  ABP: --  ABP(mean): --  RR: 18 (03 Dec 2020 10:58) (18 - 25)  SpO2: 95% (03 Dec 2020 10:58) (91% - 96%)      GEN: NAD, on hi jaden  HEENT: normocephalic and atraumatic. EOMI. PERRL.  Anicteric   NECK: Supple.   LUNGS: Crackles to auscultation.  HEART: Regular rate and rhythm without murmur.  ABDOMEN: Soft, nontender, and nondistended.  Positive bowel sounds.    : No CVA tenderness  EXTREMITIES: Without any edema.  MSK: no joint swelling  NEUROLOGIC: Cranial nerves II through XII are grossly intact. No focal deficits  PSYCHIATRIC: Appropriate affect .  SKIN: No Rash      Labs:                                        14.2   9.63  )-----------( 193      ( 04 Dec 2020 06:23 )             42.6       12-04    138  |  104  |  41.0<H>  ----------------------------<  130<H>  3.5   |  22.0  |  0.68    Ca    8.5<L>      04 Dec 2020 06:23  Mg     2.3     12-04    TPro  5.7<L>  /  Alb  2.9<L>  /  TBili  0.6  /  DBili  0.2  /  AST  66<H>  /  ALT  42<H>  /  AlkPhos  38<L>  12-04

## 2020-12-04 NOTE — PROGRESS NOTE ADULT - ATTENDING COMMENTS
I have personally seen, examined and participated in the care of this patient. I have reviewed all pertinent clinical information, including history, physical exam, plan and agree with the above.   pt confused, oriented to self and knows it is 2020. no complaints. comfortable-appearing but on HFNC I have personally seen, examined and participated in the care of this patient. I have reviewed all pertinent clinical information, including history, physical exam, plan and agree with the above.   pt confused, oriented to self and knows it is 2020. no complaints. comfortable-appearing but on HFNC  son, Fidel, updated

## 2020-12-05 LAB
ALBUMIN SERPL ELPH-MCNC: 2.9 G/DL — LOW (ref 3.3–5.2)
ALBUMIN SERPL ELPH-MCNC: 3.2 G/DL — LOW (ref 3.3–5.2)
ALP SERPL-CCNC: 40 U/L — SIGNIFICANT CHANGE UP (ref 40–120)
ALP SERPL-CCNC: 46 U/L — SIGNIFICANT CHANGE UP (ref 40–120)
ALT FLD-CCNC: 42 U/L — HIGH
ALT FLD-CCNC: 50 U/L — HIGH
ANION GAP SERPL CALC-SCNC: 11 MMOL/L — SIGNIFICANT CHANGE UP (ref 5–17)
AST SERPL-CCNC: 60 U/L — HIGH
AST SERPL-CCNC: 64 U/L — HIGH
BILIRUB DIRECT SERPL-MCNC: 0.2 MG/DL — SIGNIFICANT CHANGE UP (ref 0–0.3)
BILIRUB INDIRECT FLD-MCNC: 0.5 MG/DL — SIGNIFICANT CHANGE UP (ref 0.2–1)
BILIRUB SERPL-MCNC: 0.7 MG/DL — SIGNIFICANT CHANGE UP (ref 0.4–2)
BILIRUB SERPL-MCNC: 0.8 MG/DL — SIGNIFICANT CHANGE UP (ref 0.4–2)
BUN SERPL-MCNC: 38 MG/DL — HIGH (ref 8–20)
CALCIUM SERPL-MCNC: 8.9 MG/DL — SIGNIFICANT CHANGE UP (ref 8.6–10.2)
CHLORIDE SERPL-SCNC: 105 MMOL/L — SIGNIFICANT CHANGE UP (ref 98–107)
CO2 SERPL-SCNC: 22 MMOL/L — SIGNIFICANT CHANGE UP (ref 22–29)
CREAT SERPL-MCNC: 0.66 MG/DL — SIGNIFICANT CHANGE UP (ref 0.5–1.3)
CREAT SERPL-MCNC: 0.7 MG/DL — SIGNIFICANT CHANGE UP (ref 0.5–1.3)
D DIMER BLD IA.RAPID-MCNC: 287 NG/ML DDU — HIGH
GLUCOSE BLDC GLUCOMTR-MCNC: 114 MG/DL — HIGH (ref 70–99)
GLUCOSE BLDC GLUCOMTR-MCNC: 156 MG/DL — HIGH (ref 70–99)
GLUCOSE BLDC GLUCOMTR-MCNC: 94 MG/DL — SIGNIFICANT CHANGE UP (ref 70–99)
GLUCOSE SERPL-MCNC: 139 MG/DL — HIGH (ref 70–99)
HCT VFR BLD CALC: 42.8 % — SIGNIFICANT CHANGE UP (ref 39–50)
HGB BLD-MCNC: 14.7 G/DL — SIGNIFICANT CHANGE UP (ref 13–17)
MAGNESIUM SERPL-MCNC: 2.3 MG/DL — SIGNIFICANT CHANGE UP (ref 1.6–2.6)
MCHC RBC-ENTMCNC: 28.5 PG — SIGNIFICANT CHANGE UP (ref 27–34)
MCHC RBC-ENTMCNC: 34.3 GM/DL — SIGNIFICANT CHANGE UP (ref 32–36)
MCV RBC AUTO: 83.1 FL — SIGNIFICANT CHANGE UP (ref 80–100)
PLATELET # BLD AUTO: 178 K/UL — SIGNIFICANT CHANGE UP (ref 150–400)
POTASSIUM SERPL-MCNC: 3.8 MMOL/L — SIGNIFICANT CHANGE UP (ref 3.5–5.3)
POTASSIUM SERPL-SCNC: 3.8 MMOL/L — SIGNIFICANT CHANGE UP (ref 3.5–5.3)
PROT SERPL-MCNC: 5.8 G/DL — LOW (ref 6.6–8.7)
PROT SERPL-MCNC: 6.3 G/DL — LOW (ref 6.6–8.7)
RBC # BLD: 5.15 M/UL — SIGNIFICANT CHANGE UP (ref 4.2–5.8)
RBC # FLD: 12.9 % — SIGNIFICANT CHANGE UP (ref 10.3–14.5)
SODIUM SERPL-SCNC: 138 MMOL/L — SIGNIFICANT CHANGE UP (ref 135–145)
WBC # BLD: 11.05 K/UL — HIGH (ref 3.8–10.5)
WBC # FLD AUTO: 11.05 K/UL — HIGH (ref 3.8–10.5)

## 2020-12-05 PROCEDURE — 99233 SBSQ HOSP IP/OBS HIGH 50: CPT

## 2020-12-05 RX ORDER — HALOPERIDOL DECANOATE 100 MG/ML
2 INJECTION INTRAMUSCULAR EVERY 6 HOURS
Refills: 0 | Status: DISCONTINUED | OUTPATIENT
Start: 2020-12-05 | End: 2020-12-18

## 2020-12-05 RX ADMIN — Medication 500 MILLIGRAM(S): at 06:54

## 2020-12-05 RX ADMIN — Medication 2000 UNIT(S): at 12:10

## 2020-12-05 RX ADMIN — ENOXAPARIN SODIUM 40 MILLIGRAM(S): 100 INJECTION SUBCUTANEOUS at 23:44

## 2020-12-05 RX ADMIN — HALOPERIDOL DECANOATE 2 MILLIGRAM(S): 100 INJECTION INTRAMUSCULAR at 20:03

## 2020-12-05 RX ADMIN — REMDESIVIR 500 MILLIGRAM(S): 5 INJECTION INTRAVENOUS at 12:09

## 2020-12-05 RX ADMIN — Medication 6 MILLIGRAM(S): at 06:54

## 2020-12-05 RX ADMIN — ENOXAPARIN SODIUM 40 MILLIGRAM(S): 100 INJECTION SUBCUTANEOUS at 12:10

## 2020-12-05 RX ADMIN — Medication 500 MILLIGRAM(S): at 16:19

## 2020-12-05 RX ADMIN — ALBUTEROL 2 PUFF(S): 90 AEROSOL, METERED ORAL at 20:18

## 2020-12-05 RX ADMIN — ZINC SULFATE TAB 220 MG (50 MG ZINC EQUIVALENT) 220 MILLIGRAM(S): 220 (50 ZN) TAB at 12:10

## 2020-12-05 NOTE — PROGRESS NOTE ADULT - ATTENDING COMMENTS
I have personally seen, examined and participated in the care of this patient. I have reviewed all pertinent clinical information, including history, physical exam, plan and agree with the above.   pt confused, oriented to self and knows it is 2020. no complaints. comfortable-appearing but on HFNC  son, Fidel, updated

## 2020-12-05 NOTE — PROGRESS NOTE ADULT - SUBJECTIVE AND OBJECTIVE BOX
FELIPE AMAYA  89y  Male    Interval/overnight events/pt complaints:  Pt seen and examined at bedside this morning. No acute events reported overnight. Reports feeling better, no new complaints.    MEDICATIONS  (STANDING):  ALBUTerol    90 MICROgram(s) HFA Inhaler 2 Puff(s) Inhalation every 6 hours  ascorbic acid 500 milliGRAM(s) Oral every 12 hours  cholecalciferol 2000 Unit(s) Oral daily  dexAMETHasone  Injectable 6 milliGRAM(s) IV Push daily  enoxaparin Injectable 40 milliGRAM(s) SubCutaneous every 12 hours  remdesivir  IVPB   IV Intermittent   remdesivir  IVPB 100 milliGRAM(s) IV Intermittent every 24 hours  zinc sulfate 220 milliGRAM(s) Oral daily    Vital Signs Last 24 Hrs  T(C): 36.5 (04 Dec 2020 04:21), Max: 36.8 (03 Dec 2020 23:49)  T(F): 97.7 (04 Dec 2020 04:21), Max: 98.2 (03 Dec 2020 23:49)  HR: 72 (04 Dec 2020 04:21) (72 - 81)  BP: 156/70 (04 Dec 2020 04:21) (124/68 - 156/70)  BP(mean): --  RR: 18 (04 Dec 2020 04:21) (18 - 18)  SpO2: 96% (04 Dec 2020 04:21) (91% - 96%)  I&O's Summary    PHYSICAL EXAM:  GENERAL: eldery male, fatigues, NAD lying on side  NECK: Supple  CHEST/LUNG: HFNC 100%; normal effort, intermittent coughing Clear to auscultation bilaterally; No rales, rhonchi, wheezing  HEART: Regular S1,S2 rate and rhythm; No murmurs, rubs, or gallops  ABDOMEN: Soft, Nontender, Nondistended; Bowel sounds present  EXTREMITIES:  2+ Peripheral Pulses, No clubbing, cyanosis, edema, calf tenderness  NEURO:  Alert, and awake oreinted to self; No Focal deficits, sensory and motor intact  SKIN: No rashes or lesions, warm, dry    CAPILLARY BLOOD GLUCOSE      POCT Blood Glucose.: 131 mg/dL (04 Dec 2020 10:36)  POCT Blood Glucose.: 121 mg/dL (04 Dec 2020 06:56)  POCT Blood Glucose.: 147 mg/dL (04 Dec 2020 00:09)  POCT Blood Glucose.: 130 mg/dL (03 Dec 2020 16:57)      LABS    (12-04 @ 06:23)                      14.2  9.63 )-----------( 193                 42.6    Neutrophils = 8.11 (84.2%)  Lymphocytes = 0.59 (6.1%)  Eosinophils = 0.00 (0.0%)  Basophils = 0.00 (0.0%)  Monocytes = 0.93 (9.7%)  Bands = --%    12-04    138  |  104  |  41.0<H>  ----------------------------<  130<H>  3.5   |  22.0  |  0.68    Ca    8.5<L>      04 Dec 2020 06:23  Mg     2.3     12-04    TPro  5.7<L>  /  Alb  2.9<L>  /  TBili  0.6  /  DBili  0.2  /  AST  66<H>  /  ALT  42<H>  /  AlkPhos  38<L>  12-04    Hepatic Function Panel (12.04.20 @ 06:23)   Indirect Reacting Bilirubin: 0.4 mg/dL   Protein Total, Serum: 6.0 g/dL   Albumin, Serum: 3.0 g/dL   Bilirubin Total, Serum: 0.6 mg/dL   Bilirubin Direct, Serum: 0.2 mg/dL   Alkaline Phosphatase, Serum: 39 U/L   Aspartate Aminotransferase (AST/SGOT): 66 U/L   Alanine Aminotransferase (ALT/SGPT): 44 U/L   Procalcitonin, Serum (12.04.20 @ 06:23)   Procalcitonin, Serum: 0.14: Reference range change as of 3/21/2019 ng/mL   Lactate Dehydrogenase, Serum: 424 U/L (12.04.20 @ 06:23)   Ferritin, Serum: 665 ng/mL (12.04.20 @ 06:23)   C-Reactive Protein, Serum: 4.20 mg/dL (12.04.20 @ 06:23)   Sedimentation Rate, Erythrocyte: 20 mm/hr (12.04.20 @ 06:23)     IMAGING    Consultant(s) Notes Reviewed:  [x ] YES  [ ] NO FELIPE AMAYA  89y  Male    Interval/overnight events/pt complaints:  Pt seen and examined at bedside this morning. No acute events reported overnight. Reports feeling better, no new complaints.    MEDICATIONS  (STANDING):  ALBUTerol    90 MICROgram(s) HFA Inhaler 2 Puff(s) Inhalation every 6 hours  ascorbic acid 500 milliGRAM(s) Oral every 12 hours  cholecalciferol 2000 Unit(s) Oral daily  dexAMETHasone  Injectable 6 milliGRAM(s) IV Push daily  enoxaparin Injectable 40 milliGRAM(s) SubCutaneous every 12 hours  remdesivir  IVPB   IV Intermittent   remdesivir  IVPB 100 milliGRAM(s) IV Intermittent every 24 hours  zinc sulfate 220 milliGRAM(s) Oral daily    Vital Signs Last 24 Hrs  T(C): 36.5 (04 Dec 2020 04:21), Max: 36.8 (03 Dec 2020 23:49)  T(F): 97.7 (04 Dec 2020 04:21), Max: 98.2 (03 Dec 2020 23:49)  HR: 72 (04 Dec 2020 04:21) (72 - 81)  BP: 156/70 (04 Dec 2020 04:21) (124/68 - 156/70)  BP(mean): --  RR: 18 (04 Dec 2020 04:21) (18 - 18)  SpO2: 96% (04 Dec 2020 04:21) (91% - 96%)  I&O's Summary    PHYSICAL EXAM:  GENERAL: Eldery male, fatigues, NAD lying on side  NECK: Supple  CHEST/LUNG: HFNC 100%; normal effort, intermittent coughing. Clear to auscultation bilaterally; No rales, rhonchi, wheezing  HEART: Regular S1/S2 rate and rhythm; No murmurs, rubs, or gallops  ABDOMEN: Soft, Nontender, Nondistended; Bowel sounds present  EXTREMITIES:  2+ Peripheral Pulses, No clubbing, cyanosis, edema, calf tenderness  NEURO:  Alert, and awake oreinted to self; No Focal deficits, sensory and motor intact  SKIN: No rashes or lesions, warm, dry    CAPILLARY BLOOD GLUCOSE      POCT Blood Glucose.: 131 mg/dL (04 Dec 2020 10:36)  POCT Blood Glucose.: 121 mg/dL (04 Dec 2020 06:56)  POCT Blood Glucose.: 147 mg/dL (04 Dec 2020 00:09)  POCT Blood Glucose.: 130 mg/dL (03 Dec 2020 16:57)      LABS    (12-04 @ 06:23)                      14.2  9.63 )-----------( 193                 42.6    Neutrophils = 8.11 (84.2%)  Lymphocytes = 0.59 (6.1%)  Eosinophils = 0.00 (0.0%)  Basophils = 0.00 (0.0%)  Monocytes = 0.93 (9.7%)  Bands = --%    12-04    138  |  104  |  41.0<H>  ----------------------------<  130<H>  3.5   |  22.0  |  0.68    Ca    8.5<L>      04 Dec 2020 06:23  Mg     2.3     12-04    TPro  5.7<L>  /  Alb  2.9<L>  /  TBili  0.6  /  DBili  0.2  /  AST  66<H>  /  ALT  42<H>  /  AlkPhos  38<L>  12-04    Hepatic Function Panel (12.04.20 @ 06:23)   Indirect Reacting Bilirubin: 0.4 mg/dL   Protein Total, Serum: 6.0 g/dL   Albumin, Serum: 3.0 g/dL   Bilirubin Total, Serum: 0.6 mg/dL   Bilirubin Direct, Serum: 0.2 mg/dL   Alkaline Phosphatase, Serum: 39 U/L   Aspartate Aminotransferase (AST/SGOT): 66 U/L   Alanine Aminotransferase (ALT/SGPT): 44 U/L   Procalcitonin, Serum (12.04.20 @ 06:23)   Procalcitonin, Serum: 0.14: Reference range change as of 3/21/2019 ng/mL   Lactate Dehydrogenase, Serum: 424 U/L (12.04.20 @ 06:23)   Ferritin, Serum: 665 ng/mL (12.04.20 @ 06:23)   C-Reactive Protein, Serum: 4.20 mg/dL (12.04.20 @ 06:23)   Sedimentation Rate, Erythrocyte: 20 mm/hr (12.04.20 @ 06:23)     IMAGING    Consultant(s) Notes Reviewed:  [x ] YES  [ ] NO

## 2020-12-05 NOTE — PROGRESS NOTE ADULT - ASSESSMENT
89y/oM PMH prostate CA s/p resection not on home medications presenting with worsening SOB. Known COVID-19 infection. Seen in [facility] ER on 11/28.     #Acute hypoxic respiratory failure 2/2 COVID-19 PNA  #Transaminitis  #Thrombocytopenia   - seen in ER 11/28, found to be COVID+, c/o worsening confusion, poor appetite, +COVID contact   - returns with worsening SOB, in ER found to be 88% on RA, placed on supplemental O2  - cont dexamethasone 6mg qd daily  - ID following  - Cont remdesivir until 12/06  - pulm recs appreciated   - Supplemental oxygen prn, wean as tolerated  - trend inflammatory markers q48h  - Monitor LFTs  - Monitor CBC    #dysphagia screen   - patient able to swallow thin liquids without issue, however, placed on pureed diet as patient is confused and at risk for aspiration.  -aspirations precautions   -q6 FSG while on steroids   -hgbA1c 5.9    DVT ppx: lovenox sq    Prognosis, guarded   dispo: will need to titrate off high supplemental O2 requirement, pending clinical improvement, possible 4-5 days 89y/oM PMH prostate CA s/p resection not on home medications presenting with worsening SOB. Known COVID-19 infection. Seen in [facility] ER on 11/28.     #Acute hypoxic respiratory failure 2/2 COVID-19 PNA  #Transaminitis  #Thrombocytopenia   - seen in ER 11/28, found to be COVID+, c/o worsening confusion, poor appetite, +COVID contact   - returns with worsening SOB, in ER found to be 88% on RA, placed on supplemental O2  - cont dexamethasone 6mg qd daily  - ID following  - Cont remdesivir until 12/06  - pulm recs appreciated   - Supplemental oxygen prn, wean as tolerated  - trend inflammatory markers q48h  - Monitor LFTs  - Monitor CBC    #dysphagia screen   - patient able to swallow thin liquids without issue, however, placed on pureed diet as patient is confused and at risk for aspiration.  -aspirations precautions   -q6 FSG while on steroids   -hgbA1c 5.9    DVT ppx: lovenox sq    Prognosis, guarded   dispo: will need to titrate off high supplemental O2 requirement, pending clinical improvement, possible 4-5 days    Pt's son Fidel de la cruz

## 2020-12-06 LAB
ALBUMIN SERPL ELPH-MCNC: 2.9 G/DL — LOW (ref 3.3–5.2)
ALBUMIN SERPL ELPH-MCNC: 2.9 G/DL — LOW (ref 3.3–5.2)
ALP SERPL-CCNC: 41 U/L — SIGNIFICANT CHANGE UP (ref 40–120)
ALP SERPL-CCNC: 42 U/L — SIGNIFICANT CHANGE UP (ref 40–120)
ALT FLD-CCNC: 46 U/L — HIGH
ALT FLD-CCNC: 47 U/L — HIGH
ANION GAP SERPL CALC-SCNC: 12 MMOL/L — SIGNIFICANT CHANGE UP (ref 5–17)
AST SERPL-CCNC: 52 U/L — HIGH
AST SERPL-CCNC: 54 U/L — HIGH
BILIRUB DIRECT SERPL-MCNC: 0.3 MG/DL — SIGNIFICANT CHANGE UP (ref 0–0.3)
BILIRUB INDIRECT FLD-MCNC: 0.6 MG/DL — SIGNIFICANT CHANGE UP (ref 0.2–1)
BILIRUB SERPL-MCNC: 0.8 MG/DL — SIGNIFICANT CHANGE UP (ref 0.4–2)
BILIRUB SERPL-MCNC: 0.9 MG/DL — SIGNIFICANT CHANGE UP (ref 0.4–2)
BUN SERPL-MCNC: 34 MG/DL — HIGH (ref 8–20)
CALCIUM SERPL-MCNC: 8.4 MG/DL — LOW (ref 8.6–10.2)
CHLORIDE SERPL-SCNC: 105 MMOL/L — SIGNIFICANT CHANGE UP (ref 98–107)
CO2 SERPL-SCNC: 21 MMOL/L — LOW (ref 22–29)
CREAT SERPL-MCNC: 0.59 MG/DL — SIGNIFICANT CHANGE UP (ref 0.5–1.3)
GLUCOSE BLDC GLUCOMTR-MCNC: 137 MG/DL — HIGH (ref 70–99)
GLUCOSE BLDC GLUCOMTR-MCNC: 138 MG/DL — HIGH (ref 70–99)
GLUCOSE BLDC GLUCOMTR-MCNC: 149 MG/DL — HIGH (ref 70–99)
GLUCOSE BLDC GLUCOMTR-MCNC: 99 MG/DL — SIGNIFICANT CHANGE UP (ref 70–99)
GLUCOSE SERPL-MCNC: 123 MG/DL — HIGH (ref 70–99)
HCT VFR BLD CALC: 40.1 % — SIGNIFICANT CHANGE UP (ref 39–50)
HGB BLD-MCNC: 14 G/DL — SIGNIFICANT CHANGE UP (ref 13–17)
MCHC RBC-ENTMCNC: 29 PG — SIGNIFICANT CHANGE UP (ref 27–34)
MCHC RBC-ENTMCNC: 34.9 GM/DL — SIGNIFICANT CHANGE UP (ref 32–36)
MCV RBC AUTO: 83 FL — SIGNIFICANT CHANGE UP (ref 80–100)
PLATELET # BLD AUTO: 202 K/UL — SIGNIFICANT CHANGE UP (ref 150–400)
POTASSIUM SERPL-MCNC: 3.4 MMOL/L — LOW (ref 3.5–5.3)
POTASSIUM SERPL-SCNC: 3.4 MMOL/L — LOW (ref 3.5–5.3)
PROT SERPL-MCNC: 5.8 G/DL — LOW (ref 6.6–8.7)
PROT SERPL-MCNC: 5.8 G/DL — LOW (ref 6.6–8.7)
RBC # BLD: 4.83 M/UL — SIGNIFICANT CHANGE UP (ref 4.2–5.8)
RBC # FLD: 12.9 % — SIGNIFICANT CHANGE UP (ref 10.3–14.5)
SODIUM SERPL-SCNC: 138 MMOL/L — SIGNIFICANT CHANGE UP (ref 135–145)
WBC # BLD: 8.66 K/UL — SIGNIFICANT CHANGE UP (ref 3.8–10.5)
WBC # FLD AUTO: 8.66 K/UL — SIGNIFICANT CHANGE UP (ref 3.8–10.5)

## 2020-12-06 PROCEDURE — 99233 SBSQ HOSP IP/OBS HIGH 50: CPT

## 2020-12-06 RX ORDER — POTASSIUM CHLORIDE 20 MEQ
40 PACKET (EA) ORAL ONCE
Refills: 0 | Status: COMPLETED | OUTPATIENT
Start: 2020-12-06 | End: 2020-12-06

## 2020-12-06 RX ADMIN — HALOPERIDOL DECANOATE 2 MILLIGRAM(S): 100 INJECTION INTRAMUSCULAR at 03:22

## 2020-12-06 RX ADMIN — Medication 2000 UNIT(S): at 13:15

## 2020-12-06 RX ADMIN — Medication 500 MILLIGRAM(S): at 05:27

## 2020-12-06 RX ADMIN — ENOXAPARIN SODIUM 40 MILLIGRAM(S): 100 INJECTION SUBCUTANEOUS at 21:39

## 2020-12-06 RX ADMIN — Medication 500 MILLIGRAM(S): at 17:51

## 2020-12-06 RX ADMIN — Medication 6 MILLIGRAM(S): at 05:27

## 2020-12-06 RX ADMIN — ZINC SULFATE TAB 220 MG (50 MG ZINC EQUIVALENT) 220 MILLIGRAM(S): 220 (50 ZN) TAB at 13:15

## 2020-12-06 RX ADMIN — ALBUTEROL 2 PUFF(S): 90 AEROSOL, METERED ORAL at 20:00

## 2020-12-06 RX ADMIN — ALBUTEROL 2 PUFF(S): 90 AEROSOL, METERED ORAL at 08:47

## 2020-12-06 RX ADMIN — ALBUTEROL 2 PUFF(S): 90 AEROSOL, METERED ORAL at 15:20

## 2020-12-06 RX ADMIN — ALBUTEROL 2 PUFF(S): 90 AEROSOL, METERED ORAL at 02:41

## 2020-12-06 RX ADMIN — REMDESIVIR 500 MILLIGRAM(S): 5 INJECTION INTRAVENOUS at 13:15

## 2020-12-06 RX ADMIN — Medication 40 MILLIEQUIVALENT(S): at 17:51

## 2020-12-06 NOTE — PROGRESS NOTE ADULT - ASSESSMENT
89y/oM PMH prostate CA s/p resection not on home medications presenting with worsening SOB. Known COVID-19 infection. Seen in [facility] ER on 11/28.     #Acute hypoxic respiratory failure 2/2 COVID-19 PNA  #Transaminitis  #Thrombocytopenia   - seen in ER 11/28, found to be COVID+, c/o worsening confusion, poor appetite, +COVID contact   - returns with worsening SOB, in ER found to be 88% on RA, placed on supplemental O2  - ID following  - C/w Remdisivir per ID  - C/w Decadron  - pulm recs appreciated   - Supplemental oxygen prn, wean as tolerated  - trend inflammatory markers q48h  - Monitor LFTs  - Monitor CBC    #dysphagia screen   - patient able to swallow thin liquids without issue, however, placed on pureed diet as patient is confused and at risk for aspiration.  -aspirations precautions   -q6 FSG while on steroids   -hgbA1c 5.9    DVT ppx: lovenox sq    Prognosis, guarded   dispo: will need to titrate off high supplemental O2 requirement, pending clinical improvement    Pt's son Fidel de la cruz 89y/oM PMH prostate CA s/p resection not on home medications presenting with worsening SOB. Known COVID-19 infection. Seen in [facility] ER on 11/28.     #Acute hypoxic respiratory failure 2/2 COVID-19 PNA  #Transaminitis  #Thrombocytopenia   - seen in ER 11/28, found to be COVID+, c/o worsening confusion, poor appetite, +COVID contact   - returns with worsening SOB, in ER found to be 88% on RA, placed on supplemental O2  - ID following  - C/w Remdisivir per ID  - C/w Decadron  - pulm recs appreciated   - Supplemental oxygen prn, wean as tolerated. Currently on HFNC, titrated down to 50% FiO2.  - trend inflammatory markers q48h  - Monitor LFTs  - Monitor CBC    #dysphagia screen   - patient able to swallow thin liquids without issue, however, placed on pureed diet as patient is confused and at risk for aspiration.  -aspirations precautions   -q6 FSG while on steroids   -hgbA1c 5.9    DVT ppx: lovenox sq    Prognosis, guarded   dispo: will need to titrate off high supplemental O2 requirement, pending clinical improvement.     Pt's son Fidel updated

## 2020-12-06 NOTE — PROGRESS NOTE ADULT - SUBJECTIVE AND OBJECTIVE BOX
FELIPE AMAYA  89y  Male    Interval/overnight events/pt complaints:  Pt seen and examined at bedside this morning. No acute events reported overnight. Still confused, has no complaints, feels better.    MEDICATIONS  (STANDING):  ALBUTerol    90 MICROgram(s) HFA Inhaler 2 Puff(s) Inhalation every 6 hours  ascorbic acid 500 milliGRAM(s) Oral every 12 hours  cholecalciferol 2000 Unit(s) Oral daily  dexAMETHasone  Injectable 6 milliGRAM(s) IV Push daily  enoxaparin Injectable 40 milliGRAM(s) SubCutaneous every 12 hours  remdesivir  IVPB   IV Intermittent   remdesivir  IVPB 100 milliGRAM(s) IV Intermittent every 24 hours  zinc sulfate 220 milliGRAM(s) Oral daily    Vital Signs Last 24 Hrs  T(C): 36.5 (04 Dec 2020 04:21), Max: 36.8 (03 Dec 2020 23:49)  T(F): 97.7 (04 Dec 2020 04:21), Max: 98.2 (03 Dec 2020 23:49)  HR: 72 (04 Dec 2020 04:21) (72 - 81)  BP: 156/70 (04 Dec 2020 04:21) (124/68 - 156/70)  BP(mean): --  RR: 18 (04 Dec 2020 04:21) (18 - 18)  SpO2: 96% (04 Dec 2020 04:21) (91% - 96%)  I&O's Summary    PHYSICAL EXAM:  GENERAL: Eldery male, fatigues, NAD lying on side  NECK: Supple  CHEST/LUNG: HFNC 100%; normal effort, intermittent coughing. Clear to auscultation bilaterally; No rales, rhonchi, wheezing  HEART: Regular S1/S2 rate and rhythm; No murmurs, rubs, or gallops  ABDOMEN: Soft, Nontender, Nondistended; Bowel sounds present  EXTREMITIES:  2+ Peripheral Pulses, No clubbing, cyanosis, edema, calf tenderness  NEURO:  Alert, and awake oreinted to self; No Focal deficits, sensory and motor intact  SKIN: No rashes or lesions, warm, dry    CAPILLARY BLOOD GLUCOSE      POCT Blood Glucose.: 131 mg/dL (04 Dec 2020 10:36)  POCT Blood Glucose.: 121 mg/dL (04 Dec 2020 06:56)  POCT Blood Glucose.: 147 mg/dL (04 Dec 2020 00:09)  POCT Blood Glucose.: 130 mg/dL (03 Dec 2020 16:57)      LABS    (12-04 @ 06:23)                      14.2  9.63 )-----------( 193                 42.6    Neutrophils = 8.11 (84.2%)  Lymphocytes = 0.59 (6.1%)  Eosinophils = 0.00 (0.0%)  Basophils = 0.00 (0.0%)  Monocytes = 0.93 (9.7%)  Bands = --%    12-04    138  |  104  |  41.0<H>  ----------------------------<  130<H>  3.5   |  22.0  |  0.68    Ca    8.5<L>      04 Dec 2020 06:23  Mg     2.3     12-04    TPro  5.7<L>  /  Alb  2.9<L>  /  TBili  0.6  /  DBili  0.2  /  AST  66<H>  /  ALT  42<H>  /  AlkPhos  38<L>  12-04    Hepatic Function Panel (12.04.20 @ 06:23)   Indirect Reacting Bilirubin: 0.4 mg/dL   Protein Total, Serum: 6.0 g/dL   Albumin, Serum: 3.0 g/dL   Bilirubin Total, Serum: 0.6 mg/dL   Bilirubin Direct, Serum: 0.2 mg/dL   Alkaline Phosphatase, Serum: 39 U/L   Aspartate Aminotransferase (AST/SGOT): 66 U/L   Alanine Aminotransferase (ALT/SGPT): 44 U/L   Procalcitonin, Serum (12.04.20 @ 06:23)   Procalcitonin, Serum: 0.14: Reference range change as of 3/21/2019 ng/mL   Lactate Dehydrogenase, Serum: 424 U/L (12.04.20 @ 06:23)   Ferritin, Serum: 665 ng/mL (12.04.20 @ 06:23)   C-Reactive Protein, Serum: 4.20 mg/dL (12.04.20 @ 06:23)   Sedimentation Rate, Erythrocyte: 20 mm/hr (12.04.20 @ 06:23)     IMAGING    Consultant(s) Notes Reviewed:  [x ] YES  [ ] NO

## 2020-12-07 LAB
ALBUMIN SERPL ELPH-MCNC: 2.8 G/DL — LOW (ref 3.3–5.2)
ALBUMIN SERPL ELPH-MCNC: 2.8 G/DL — LOW (ref 3.3–5.2)
ALP SERPL-CCNC: 40 U/L — SIGNIFICANT CHANGE UP (ref 40–120)
ALP SERPL-CCNC: 43 U/L — SIGNIFICANT CHANGE UP (ref 40–120)
ALT FLD-CCNC: 41 U/L — HIGH
ALT FLD-CCNC: 45 U/L — HIGH
ANION GAP SERPL CALC-SCNC: 9 MMOL/L — SIGNIFICANT CHANGE UP (ref 5–17)
AST SERPL-CCNC: 40 U/L — HIGH
AST SERPL-CCNC: 43 U/L — HIGH
BILIRUB DIRECT SERPL-MCNC: 0.2 MG/DL — SIGNIFICANT CHANGE UP (ref 0–0.3)
BILIRUB INDIRECT FLD-MCNC: 0.7 MG/DL — SIGNIFICANT CHANGE UP (ref 0.2–1)
BILIRUB SERPL-MCNC: 0.8 MG/DL — SIGNIFICANT CHANGE UP (ref 0.4–2)
BILIRUB SERPL-MCNC: 0.9 MG/DL — SIGNIFICANT CHANGE UP (ref 0.4–2)
BUN SERPL-MCNC: 40 MG/DL — HIGH (ref 8–20)
CALCIUM SERPL-MCNC: 8.7 MG/DL — SIGNIFICANT CHANGE UP (ref 8.6–10.2)
CHLORIDE SERPL-SCNC: 110 MMOL/L — HIGH (ref 98–107)
CO2 SERPL-SCNC: 22 MMOL/L — SIGNIFICANT CHANGE UP (ref 22–29)
CREAT SERPL-MCNC: 0.69 MG/DL — SIGNIFICANT CHANGE UP (ref 0.5–1.3)
CRP SERPL-MCNC: 4.58 MG/DL — HIGH (ref 0–0.4)
D DIMER BLD IA.RAPID-MCNC: 457 NG/ML DDU — HIGH
FERRITIN SERPL-MCNC: 479 NG/ML — HIGH (ref 30–400)
GLUCOSE BLDC GLUCOMTR-MCNC: 120 MG/DL — HIGH (ref 70–99)
GLUCOSE BLDC GLUCOMTR-MCNC: 134 MG/DL — HIGH (ref 70–99)
GLUCOSE BLDC GLUCOMTR-MCNC: 137 MG/DL — HIGH (ref 70–99)
GLUCOSE BLDC GLUCOMTR-MCNC: 144 MG/DL — HIGH (ref 70–99)
GLUCOSE BLDC GLUCOMTR-MCNC: 228 MG/DL — HIGH (ref 70–99)
GLUCOSE SERPL-MCNC: 121 MG/DL — HIGH (ref 70–99)
HCT VFR BLD CALC: 42.2 % — SIGNIFICANT CHANGE UP (ref 39–50)
HGB BLD-MCNC: 14.3 G/DL — SIGNIFICANT CHANGE UP (ref 13–17)
LDH SERPL L TO P-CCNC: 532 U/L — HIGH (ref 98–192)
MCHC RBC-ENTMCNC: 28.3 PG — SIGNIFICANT CHANGE UP (ref 27–34)
MCHC RBC-ENTMCNC: 33.9 GM/DL — SIGNIFICANT CHANGE UP (ref 32–36)
MCV RBC AUTO: 83.6 FL — SIGNIFICANT CHANGE UP (ref 80–100)
PLATELET # BLD AUTO: 191 K/UL — SIGNIFICANT CHANGE UP (ref 150–400)
POTASSIUM SERPL-MCNC: 4.3 MMOL/L — SIGNIFICANT CHANGE UP (ref 3.5–5.3)
POTASSIUM SERPL-SCNC: 4.3 MMOL/L — SIGNIFICANT CHANGE UP (ref 3.5–5.3)
PROCALCITONIN SERPL-MCNC: 0.1 NG/ML — SIGNIFICANT CHANGE UP (ref 0.02–0.1)
PROT SERPL-MCNC: 5.9 G/DL — LOW (ref 6.6–8.7)
PROT SERPL-MCNC: 6 G/DL — LOW (ref 6.6–8.7)
RBC # BLD: 5.05 M/UL — SIGNIFICANT CHANGE UP (ref 4.2–5.8)
RBC # FLD: 13.2 % — SIGNIFICANT CHANGE UP (ref 10.3–14.5)
SODIUM SERPL-SCNC: 141 MMOL/L — SIGNIFICANT CHANGE UP (ref 135–145)
WBC # BLD: 9.43 K/UL — SIGNIFICANT CHANGE UP (ref 3.8–10.5)
WBC # FLD AUTO: 9.43 K/UL — SIGNIFICANT CHANGE UP (ref 3.8–10.5)

## 2020-12-07 PROCEDURE — 99233 SBSQ HOSP IP/OBS HIGH 50: CPT

## 2020-12-07 PROCEDURE — 99232 SBSQ HOSP IP/OBS MODERATE 35: CPT

## 2020-12-07 RX ORDER — DEXTROSE 50 % IN WATER 50 %
15 SYRINGE (ML) INTRAVENOUS ONCE
Refills: 0 | Status: DISCONTINUED | OUTPATIENT
Start: 2020-12-07 | End: 2020-12-18

## 2020-12-07 RX ORDER — SODIUM CHLORIDE 9 MG/ML
1000 INJECTION, SOLUTION INTRAVENOUS
Refills: 0 | Status: DISCONTINUED | OUTPATIENT
Start: 2020-12-07 | End: 2020-12-18

## 2020-12-07 RX ORDER — DEXTROSE 50 % IN WATER 50 %
25 SYRINGE (ML) INTRAVENOUS ONCE
Refills: 0 | Status: DISCONTINUED | OUTPATIENT
Start: 2020-12-07 | End: 2020-12-18

## 2020-12-07 RX ORDER — DEXTROSE 50 % IN WATER 50 %
12.5 SYRINGE (ML) INTRAVENOUS ONCE
Refills: 0 | Status: DISCONTINUED | OUTPATIENT
Start: 2020-12-07 | End: 2020-12-18

## 2020-12-07 RX ORDER — GLUCAGON INJECTION, SOLUTION 0.5 MG/.1ML
1 INJECTION, SOLUTION SUBCUTANEOUS ONCE
Refills: 0 | Status: DISCONTINUED | OUTPATIENT
Start: 2020-12-07 | End: 2020-12-18

## 2020-12-07 RX ORDER — INSULIN LISPRO 100/ML
VIAL (ML) SUBCUTANEOUS
Refills: 0 | Status: DISCONTINUED | OUTPATIENT
Start: 2020-12-07 | End: 2020-12-18

## 2020-12-07 RX ADMIN — Medication 500 MILLIGRAM(S): at 05:19

## 2020-12-07 RX ADMIN — Medication 2000 UNIT(S): at 10:48

## 2020-12-07 RX ADMIN — ZINC SULFATE TAB 220 MG (50 MG ZINC EQUIVALENT) 220 MILLIGRAM(S): 220 (50 ZN) TAB at 10:48

## 2020-12-07 RX ADMIN — ENOXAPARIN SODIUM 40 MILLIGRAM(S): 100 INJECTION SUBCUTANEOUS at 10:48

## 2020-12-07 RX ADMIN — Medication 500 MILLIGRAM(S): at 17:33

## 2020-12-07 RX ADMIN — ALBUTEROL 2 PUFF(S): 90 AEROSOL, METERED ORAL at 02:00

## 2020-12-07 RX ADMIN — ENOXAPARIN SODIUM 40 MILLIGRAM(S): 100 INJECTION SUBCUTANEOUS at 23:00

## 2020-12-07 RX ADMIN — Medication 6 MILLIGRAM(S): at 05:19

## 2020-12-07 NOTE — DIETITIAN INITIAL EVALUATION ADULT. - ADD RECOMMEND
Add Ensure Enlive TID to optimize po intake and provide an additional 350kcal, 20g protein per serving. Continue Vitamin C (500mg), Zinc Sulfate (220mg). Monitor wts and labs.

## 2020-12-07 NOTE — PROGRESS NOTE ADULT - SUBJECTIVE AND OBJECTIVE BOX
[facility]  INFECTIOUS DISEASES AND INTERNAL MEDICINE at [place]  =======================================================  Yang Prescott MD  Diplomates American Board of Internal Medicine and Infectious Diseases  Tel: [phone]      Fax: [phone]  =======================================================    FELIPE AMAYA [id number]    Follow up: covid 19  remains on hi jaden  denies complaints    Allergies:  No Known Allergies      Medications:  ALBUTerol    90 MICROgram(s) HFA Inhaler 2 Puff(s) Inhalation every 6 hours  ascorbic acid 500 milliGRAM(s) Oral every 12 hours  cholecalciferol 2000 Unit(s) Oral daily  dexAMETHasone  Injectable 6 milliGRAM(s) IV Push daily  enoxaparin Injectable 40 milliGRAM(s) SubCutaneous every 12 hours  remdesivir  IVPB   IV Intermittent   remdesivir  IVPB 100 milliGRAM(s) IV Intermittent every 24 hours  zinc sulfate 220 milliGRAM(s) Oral daily            REVIEW OF SYSTEMS:  CONSTITUTIONAL:  No Fever or chills  HEENT:   No diplopia or blurred vision.  No earache, sore throat or runny nose.  CARDIOVASCULAR:  No pressure, squeezing, strangling, tightness, heaviness or aching about the chest, neck, axilla or epigastrium.  RESPIRATORY:  No cough, shortness of breath  GASTROINTESTINAL:  No nausea, vomiting or diarrhea.  GENITOURINARY:  No dysuria, frequency or urgency. No Blood in urine  MUSCULOSKELETAL:  no joint aches, no muscle pain  SKIN:  No change in skin, hair or nails.  NEUROLOGIC:  No Headaches, seizures or weakness.  PSYCHIATRIC:  No disorder of thought or mood.  ENDOCRINE:  No heat or cold intolerance  HEMATOLOGICAL:  No easy bruising or bleeding.            Physical Exam:  ICU Vital Signs Last 24 Hrs  T(C): 36.6 (03 Dec 2020 10:58), Max: 37.1 (02 Dec 2020 23:51)  T(F): 97.8 (03 Dec 2020 10:58), Max: 98.7 (02 Dec 2020 23:51)  HR: 68 (03 Dec 2020 10:58) (68 - 97)  BP: 115/63 (03 Dec 2020 10:58) (115/63 - 150/67)  BP(mean): --  ABP: --  ABP(mean): --  RR: 18 (03 Dec 2020 10:58) (18 - 25)  SpO2: 95% (03 Dec 2020 10:58) (91% - 96%)      GEN: NAD, on hi jaden  HEENT: normocephalic and atraumatic. EOMI. PERRL.  Anicteric   NECK: Supple.   LUNGS: Crackles to auscultation.  HEART: Regular rate and rhythm without murmur.  ABDOMEN: Soft, nontender, and nondistended.  Positive bowel sounds.    : No CVA tenderness  EXTREMITIES: Without any edema.  MSK: no joint swelling  NEUROLOGIC: Cranial nerves II through XII are grossly intact. No focal deficits  PSYCHIATRIC: Appropriate affect .  SKIN: No Rash      Labs:                                        14.2   9.63  )-----------( 193      ( 04 Dec 2020 06:23 )             42.6       12-07    141  |  110<H>  |  40.0<H>  ----------------------------<  121<H>  4.3   |  22.0  |  0.69    Ca    8.7      07 Dec 2020 06:01    TPro  5.9<L>  /  Alb  2.8<L>  /  TBili  0.8  /  DBili  0.2  /  AST  40<H>  /  ALT  41<H>  /  AlkPhos  40  12-07

## 2020-12-07 NOTE — PROGRESS NOTE ADULT - ASSESSMENT
89 year old male hx of prostate cancer s/p resection, on no home meds presents with SOB and STINSON getting worsening of symptoms after getting COVID-19. Pt in ED hypoxic on room air now on High flow oxygen.     COVID 19 + infection  Viral pneumonia  Acute hypoxic respiratory failure  transaminitis  Leukopenia  Thrombocytopenia      - remains on hi jaden  - s/p remdesivir and decadron x 5 days-defer further doses  - PCT low, inflammatory markers elevated  - Avoid antibiotics unless there is a concern for a bacterial infection  - VTE prophylaxis per current [facility] COVID 19 protocol  - Check CBC with diff, CMP with LFT's daily,  ESR, CRP, Ferritin, LDH,  procalcitonin,  D dimer q48h  - if  d dimer uptrending consider venous Le dopplers and full dose AC  - Encourage self proning q2h and ambulation as tolerated  - Taper off O2 as tolerated- once tolerating room air would ideally monitor for 24h prior to discharge.  - Inpatient Contact/Airborne isolation   - GOC        signing off

## 2020-12-07 NOTE — PROGRESS NOTE ADULT - ASSESSMENT
89y/oM PMH prostate CA s/p resection not on home medications presenting with worsening SOB. Known COVID-19 infection. Seen in [facility] ER on 11/28.     #Acute hypoxic respiratory failure 2/2 COVID-19 PNA  #Transaminitis -improved  #Thrombocytopenia - resolved  - seen in ER 11/28, found to be COVID+, c/o worsening confusion, poor appetite, +COVID contact   - returns with worsening SOB, in ER found to be 88% on RA, placed on supplemental O2  - ID following  - S/p 5 days of Remdesivir  - C/w Decadron  - pulm recs appreciated   - Supplemental oxygen prn, wean as tolerated. Currently on HFNC. Weaned from 80% to 65% today  - trend inflammatory markers q48h  - Monitor LFTs  - Monitor CBC    #Dysphagia screen   - patient able to swallow thin liquids without issue, however, placed on pureed diet as patient is confused and at risk for aspiration.  - FS/LIZETTE  - Aspirations precautions   - HgbA1C: 5.9    DVT ppx: lovenox sq    Prognosis, guarded     dispo: will need to titrate off high supplemental O2 requirement, pending clinical improvement.     Pt's son Fidel dorothy 89y/oM PMH prostate CA s/p resection not on home medications presenting with worsening SOB. Known COVID-19 infection. Seen in [facility] ER on 11/28.     #Acute hypoxic respiratory failure 2/2 COVID-19 PNA  #Transaminitis -improved  #Thrombocytopenia - resolved  - seen in ER 11/28, found to be COVID+, c/o worsening confusion, poor appetite, +COVID contact   - returns with worsening SOB, in ER found to be 88% on RA, placed on supplemental O2  - ID following  - S/p 5 days of Remdesivir  - C/w Decadron  - pulm recs appreciated   - Supplemental oxygen prn, wean as tolerated. Currently on HFNC. Weaned from 80% to 65% today  - Encourage proning, incentive spirometry  - trend inflammatory markers q48h  - Monitor LFTs  - Monitor CBC    #Dysphagia screen   - patient able to swallow thin liquids without issue, however, placed on pureed diet as patient is confused and at risk for aspiration. Will consult SLP for offial eval.  - FS/LIZETTE  - Aspirations precautions   - HgbA1C: 5.9    DVT ppx: lovenox sq    Prognosis, guarded     dispo: will need to titrate off high supplemental O2 requirement, pending clinical improvement.     Pt's son Fidel dorothy 89y/oM PMH prostate CA s/p resection not on home medications presenting with worsening SOB. Known COVID-19 infection. Seen in [facility] ER on 11/28.     #Acute hypoxic respiratory failure 2/2 COVID-19 PNA  #Transaminitis -improved  #Thrombocytopenia - resolved  #Metabolic encephalopathy secondary to COVID-19  - seen in ER 11/28, found to be COVID+, c/o worsening confusion, poor appetite, +COVID contact   - returns with worsening SOB, in ER found to be 88% on RA, placed on supplemental O2  - ID following  - S/p 5 days of Remdesivir  - C/w Decadron  - pulm recs appreciated   - Supplemental oxygen prn, wean as tolerated. Currently on HFNC. Weaned from 80% to 65% today  - Encourage proning, incentive spirometry  - trend inflammatory markers q48h  - Monitor LFTs  - Monitor CBC    #Dysphagia screen   - patient able to swallow thin liquids without issue, however, placed on pureed diet as patient is confused and at risk for aspiration. Will consult SLP for offial eval.  - FS/LIZETTE  - Aspirations precautions   - HgbA1C: 5.9    DVT ppx: lovenox sq    Prognosis, guarded     dispo: will need to titrate off high supplemental O2 requirement, pending clinical improvement.     Pt's son Fidel dorothy 89y/oM PMH prostate CA s/p resection not on home medications presenting with worsening SOB. Known COVID-19 infection. Seen in [facility] ER on 11/28.     #Acute hypoxic respiratory failure 2/2 COVID-19 PNA  #Transaminitis in setting of COVID-19 and Remdisivir -improved  #Thrombocytopenia - resolved  #Metabolic encephalopathy secondary to COVID-19  - seen in ER 11/28, found to be COVID+, c/o worsening confusion, poor appetite, +COVID contact. Returns with worsening SOB, in ER found to be 88% on RA, placed on supplemental O2  - ID following  - S/p 5 days of Remdesivir  - C/w Decadron 5mg IV qd  - Pulm recs appreciated.  - Supplemental oxygen prn, wean as tolerated. Currently on HFNC. Weaned from 80% to 65% today  - Encourage proning, incentive spirometry  - trend inflammatory markers q48h  - Monitor LFTs  - Monitor CBC    #Dysphagia screen   - patient able to swallow thin liquids without issue, however, placed on pureed diet as patient is confused and at risk for aspiration. Will consult SLP for official evaluation  - FS/LIZETTE  - Aspirations precautions   - HgbA1C: 5.9    PT consult for deconditioning    DVT ppx sq Lovenox    Prognosis, guarded     Advanced Directives: Full code per patient's son, wants everything done. He wants father to return home.     Dispo/LOS anticipation: Pending clinical improvement, still on HFNC.     Pt's son Fidel de la cruz

## 2020-12-07 NOTE — DIETITIAN INITIAL EVALUATION ADULT. - OTHER INFO
89y/oM PMH prostate CA s/p resection not on home medications presenting with worsening SOB. Known COVID-19 infection. Seen in [facility] ER on 11/28. patient able to swallow thin liquids without issue, however, placed on pureed diet as patient is confused and at risk for aspiration. Currently on HFNC. hgbA1c 5.9 noted. Pt with poor po intake per documentation, requires total assistance with meals. Last documented BM 12/4.

## 2020-12-07 NOTE — DIETITIAN INITIAL EVALUATION ADULT. - PERTINENT LABORATORY DATA
12-07 Na141 mmol/L Glu 121 mg/dL<H> K+ 4.3 mmol/L Cr  0.69 mg/dL BUN 40.0 mg/dL<H> Phos n/a   Alb 2.8 g/dL<L> PAB n/a

## 2020-12-07 NOTE — PROGRESS NOTE ADULT - SUBJECTIVE AND OBJECTIVE BOX
FELIPE AMAYA  89y  Male    Interval/overnight events/pt complaints:  Pt seen and examined at bedside this morning. No acute events reported overnight. Mental status appears improved, however still confused. Appears in no distress. Does not endorse any complaints. Still on HFNC    MEDICATIONS  (STANDING):  ALBUTerol    90 MICROgram(s) HFA Inhaler 2 Puff(s) Inhalation every 6 hours  ascorbic acid 500 milliGRAM(s) Oral every 12 hours  cholecalciferol 2000 Unit(s) Oral daily  dexAMETHasone  Injectable 6 milliGRAM(s) IV Push daily  enoxaparin Injectable 40 milliGRAM(s) SubCutaneous every 12 hours  remdesivir  IVPB   IV Intermittent   remdesivir  IVPB 100 milliGRAM(s) IV Intermittent every 24 hours  zinc sulfate 220 milliGRAM(s) Oral daily    Vital Signs Last 24 Hrs  T(C): 36.5 (04 Dec 2020 04:21), Max: 36.8 (03 Dec 2020 23:49)  T(F): 97.7 (04 Dec 2020 04:21), Max: 98.2 (03 Dec 2020 23:49)  HR: 72 (04 Dec 2020 04:21) (72 - 81)  BP: 156/70 (04 Dec 2020 04:21) (124/68 - 156/70)  BP(mean): --  RR: 18 (04 Dec 2020 04:21) (18 - 18)  SpO2: 96% (04 Dec 2020 04:21) (91% - 96%)  I&O's Summary    PHYSICAL EXAM:  GENERAL: Eldery male, fatigues, NAD lying on side  NECK: Supple  CHEST/LUNG: HFNC 100%; normal effort, intermittent coughing. Clear to auscultation bilaterally; No rales, rhonchi, wheezing  HEART: Regular S1/S2 rate and rhythm; No murmurs, rubs, or gallops  ABDOMEN: Soft, Nontender, Nondistended; Bowel sounds present  EXTREMITIES:  2+ Peripheral Pulses, No clubbing, cyanosis, edema, calf tenderness  NEURO:  Alert, and awake orented to self; No Focal deficits, sensory and motor intact  SKIN: No rashes or lesions, warm, dry    CAPILLARY BLOOD GLUCOSE      POCT Blood Glucose.: 131 mg/dL (04 Dec 2020 10:36)  POCT Blood Glucose.: 121 mg/dL (04 Dec 2020 06:56)  POCT Blood Glucose.: 147 mg/dL (04 Dec 2020 00:09)  POCT Blood Glucose.: 130 mg/dL (03 Dec 2020 16:57)      LABS    (12-04 @ 06:23)                      14.2  9.63 )-----------( 193                 42.6    Neutrophils = 8.11 (84.2%)  Lymphocytes = 0.59 (6.1%)  Eosinophils = 0.00 (0.0%)  Basophils = 0.00 (0.0%)  Monocytes = 0.93 (9.7%)  Bands = --%    12-04    138  |  104  |  41.0<H>  ----------------------------<  130<H>  3.5   |  22.0  |  0.68    Ca    8.5<L>      04 Dec 2020 06:23  Mg     2.3     12-04    TPro  5.7<L>  /  Alb  2.9<L>  /  TBili  0.6  /  DBili  0.2  /  AST  66<H>  /  ALT  42<H>  /  AlkPhos  38<L>  12-04    Hepatic Function Panel (12.04.20 @ 06:23)   Indirect Reacting Bilirubin: 0.4 mg/dL   Protein Total, Serum: 6.0 g/dL   Albumin, Serum: 3.0 g/dL   Bilirubin Total, Serum: 0.6 mg/dL   Bilirubin Direct, Serum: 0.2 mg/dL   Alkaline Phosphatase, Serum: 39 U/L   Aspartate Aminotransferase (AST/SGOT): 66 U/L   Alanine Aminotransferase (ALT/SGPT): 44 U/L   Procalcitonin, Serum (12.04.20 @ 06:23)   Procalcitonin, Serum: 0.14: Reference range change as of 3/21/2019 ng/mL   Lactate Dehydrogenase, Serum: 424 U/L (12.04.20 @ 06:23)   Ferritin, Serum: 665 ng/mL (12.04.20 @ 06:23)   C-Reactive Protein, Serum: 4.20 mg/dL (12.04.20 @ 06:23)   Sedimentation Rate, Erythrocyte: 20 mm/hr (12.04.20 @ 06:23)     IMAGING    Consultant(s) Notes Reviewed:  [x ] YES  [ ] NO

## 2020-12-08 LAB
ALBUMIN SERPL ELPH-MCNC: 2.8 G/DL — LOW (ref 3.3–5.2)
ALBUMIN SERPL ELPH-MCNC: 3.1 G/DL — LOW (ref 3.3–5.2)
ALP SERPL-CCNC: 41 U/L — SIGNIFICANT CHANGE UP (ref 40–120)
ALP SERPL-CCNC: 48 U/L — SIGNIFICANT CHANGE UP (ref 40–120)
ALT FLD-CCNC: 35 U/L — SIGNIFICANT CHANGE UP
ALT FLD-CCNC: 36 U/L — SIGNIFICANT CHANGE UP
AST SERPL-CCNC: 22 U/L — SIGNIFICANT CHANGE UP
AST SERPL-CCNC: 24 U/L — SIGNIFICANT CHANGE UP
BILIRUB DIRECT SERPL-MCNC: 0.3 MG/DL — SIGNIFICANT CHANGE UP (ref 0–0.3)
BILIRUB DIRECT SERPL-MCNC: 0.3 MG/DL — SIGNIFICANT CHANGE UP (ref 0–0.3)
BILIRUB INDIRECT FLD-MCNC: 0.7 MG/DL — SIGNIFICANT CHANGE UP (ref 0.2–1)
BILIRUB INDIRECT FLD-MCNC: 0.7 MG/DL — SIGNIFICANT CHANGE UP (ref 0.2–1)
BILIRUB SERPL-MCNC: 1 MG/DL — SIGNIFICANT CHANGE UP (ref 0.4–2)
BILIRUB SERPL-MCNC: 1 MG/DL — SIGNIFICANT CHANGE UP (ref 0.4–2)
CREAT SERPL-MCNC: 0.61 MG/DL — SIGNIFICANT CHANGE UP (ref 0.5–1.3)
CREAT SERPL-MCNC: 0.69 MG/DL — SIGNIFICANT CHANGE UP (ref 0.5–1.3)
GLUCOSE BLDC GLUCOMTR-MCNC: 118 MG/DL — HIGH (ref 70–99)
GLUCOSE BLDC GLUCOMTR-MCNC: 134 MG/DL — HIGH (ref 70–99)
GLUCOSE BLDC GLUCOMTR-MCNC: 150 MG/DL — HIGH (ref 70–99)
GLUCOSE BLDC GLUCOMTR-MCNC: 168 MG/DL — HIGH (ref 70–99)
PROT SERPL-MCNC: 6 G/DL — LOW (ref 6.6–8.7)
PROT SERPL-MCNC: 6.6 G/DL — SIGNIFICANT CHANGE UP (ref 6.6–8.7)

## 2020-12-08 PROCEDURE — 99233 SBSQ HOSP IP/OBS HIGH 50: CPT

## 2020-12-08 RX ORDER — HYDRALAZINE HCL 50 MG
10 TABLET ORAL ONCE
Refills: 0 | Status: COMPLETED | OUTPATIENT
Start: 2020-12-08 | End: 2020-12-08

## 2020-12-08 RX ORDER — REMDESIVIR 5 MG/ML
100 INJECTION INTRAVENOUS EVERY 24 HOURS
Refills: 0 | Status: DISCONTINUED | OUTPATIENT
Start: 2020-12-08 | End: 2020-12-09

## 2020-12-08 RX ADMIN — Medication 2000 UNIT(S): at 12:15

## 2020-12-08 RX ADMIN — Medication 10 MILLIGRAM(S): at 05:06

## 2020-12-08 RX ADMIN — REMDESIVIR 500 MILLIGRAM(S): 5 INJECTION INTRAVENOUS at 16:48

## 2020-12-08 RX ADMIN — Medication 6 MILLIGRAM(S): at 05:06

## 2020-12-08 RX ADMIN — ENOXAPARIN SODIUM 40 MILLIGRAM(S): 100 INJECTION SUBCUTANEOUS at 22:04

## 2020-12-08 RX ADMIN — ZINC SULFATE TAB 220 MG (50 MG ZINC EQUIVALENT) 220 MILLIGRAM(S): 220 (50 ZN) TAB at 12:14

## 2020-12-08 RX ADMIN — Medication 2: at 08:56

## 2020-12-08 RX ADMIN — Medication 500 MILLIGRAM(S): at 16:48

## 2020-12-08 RX ADMIN — Medication 500 MILLIGRAM(S): at 05:06

## 2020-12-08 RX ADMIN — ENOXAPARIN SODIUM 40 MILLIGRAM(S): 100 INJECTION SUBCUTANEOUS at 12:15

## 2020-12-08 NOTE — PHYSICAL THERAPY INITIAL EVALUATION ADULT - ADDITIONAL COMMENTS
pt is a poor historian. per [facility] assessment, pt lives with family in a home where there are 2 steps to enter. pt has a cane, RW, shower chair, and wc. prior level of function and available assist at home are not indicated. will request further information to facilitate d/c recommendations.

## 2020-12-08 NOTE — PHYSICAL THERAPY INITIAL EVALUATION ADULT - DISCHARGE DISPOSITION, PT EVAL
home with 24/7 assist vs MARY pending progress and information regarding prior level of function and level of assist available at home.

## 2020-12-08 NOTE — PROGRESS NOTE ADULT - ASSESSMENT
89y/oM PMH prostate CA s/p resection not on home medications presenting with worsening SOB. Known COVID-19 infection. Seen in [facility] ER on 11/28.     1. Acute hypoxic respiratory failure 2/2 COVID-19 PNA  - ID following  - S/p remdesivir and decadron x 5 days-defer further doses per ID  - Pulm recs appreciated  - Supplemental oxygen prn, wean as tolerated. Currently on 50% HFNC  - Encourage proning, incentive spirometry  - Trend inflammatory markers q48h    2. Transaminitis in setting of COVID-19 and Remdisivir   - Improved  - Monitor    3. Thrombocytopenia   - Likely 2/2 infection, now resolved    4. Metabolic encephalopathy   - Secondary to COVID-19  - Improving    5. Dysphagia screen   - Patient able to swallow thin liquids without issue, however, placed on pureed diet as patient is confused and at risk for aspiration. Will consult SLP for official evaluation, pending   - Aspirations precautions       DVT ppx sq Lovenox  Prognosis, guarded     Advanced Directives: Full code per patient's son, wants everything done. He wants father to return home.     Dispo/LOS anticipation: Pending clinical improvement, still on HFNC. PT consult pending    89y/oM PMH prostate CA s/p resection not on home medications presenting with worsening SOB. Known COVID-19 infection. Seen in [facility] ER on 11/28.     1. Acute hypoxic respiratory failure 2/2 COVID-19 PNA  - ID following  - S/p remdesivir and decadron x 5 days-defer further doses per ID  - Pulm recs appreciated  - Supplemental oxygen prn, wean as tolerated. Currently on 50% HFNC  - Encourage proning, incentive spirometry  - Trend inflammatory markers q48h    2. Transaminitis in setting of COVID-19 and Remdisivir   - Improved  - Monitor    3. Thrombocytopenia   - Likely 2/2 infection, now resolved    4. Metabolic encephalopathy   - Secondary to COVID-19  - Improving    5. Dysphagia screen   - Patient able to swallow thin liquids without issue, however, placed on pureed diet as patient is confused and at risk for aspiration. Will consult SLP for official evaluation, pending   - Aspirations precautions       DVT ppx sq Lovenox  Prognosis, guarded     Advanced Directives: Full code per patient's son, wants everything done. He wants father to return home.     Dispo/LOS anticipation: Pending clinical improvement, still on HFNC. PT consult pending   Attempted to call yaya Parra this morning around 9:00am, no answer. Will attempt in the afternoon    89y/oM PMH prostate CA s/p resection not on home medications presenting with worsening SOB. Known COVID-19 infection. Seen in [facility] ER on 11/28.     1. Acute hypoxic respiratory failure 2/2 COVID-19 PNA  - ID following  - S/p remdesivir and decadron x 5 days-defer further doses per ID  - Pulm recs appreciated  - Supplemental oxygen prn, wean as tolerated. Currently on 70% HFNC  - Encourage proning, incentive spirometry  - Trend inflammatory markers q48h    2. Transaminitis in setting of COVID-19 and Remdisivir   - Improved  - Monitor    3. Thrombocytopenia   - Likely 2/2 infection, now resolved    4. Metabolic encephalopathy   - Secondary to COVID-19  - Improving    5. Dysphagia screen   - Patient able to swallow thin liquids without issue, however, placed on pureed diet as patient is confused and at risk for aspiration. Will consult SLP for official evaluation, pending   - Aspirations precautions       DVT ppx sq Lovenox  Prognosis, guarded     Advanced Directives: Full code per patient's son, wants everything done. He wants father to return home.     Dispo/LOS anticipation: Pending clinical improvement, still on HFNC. PT consult pending   Attempted to call yaya Parra this morning around 9:00am, no answer. Will attempt in the afternoon    89y/oM PMH prostate CA s/p resection not on home medications presenting with worsening SOB. Known COVID-19 infection. Seen in [facility] ER on 11/28.     1. Acute hypoxic respiratory failure 2/2 COVID-19 PNA  - ID following  - S/p remdesivir x5 days-defer further doses per ID  - Continue decadron  - Pulm recs appreciated  - Supplemental oxygen prn, wean as tolerated. Currently on 70% HFNC  - Encourage proning, incentive spirometry  - Trend inflammatory markers q48h    2. Transaminitis in setting of COVID-19 and Remdisivir   - Improved  - Monitor    3. Thrombocytopenia   - Likely 2/2 infection, now resolved    4. Metabolic encephalopathy   - Secondary to COVID-19  - Improving    5. Dysphagia screen   - Patient able to swallow thin liquids without issue, however, placed on pureed diet as patient is confused and at risk for aspiration. Will consult SLP for official evaluation, pending   - Aspirations precautions       DVT ppx sq Lovenox  Prognosis, guarded     Advanced Directives: Full code per patient's son, wants everything done. He wants father to return home.     Dispo/LOS anticipation: Pending clinical improvement, still on HFNC. PT consult pending   Attempted to call yaya Parra this morning around 9:00am, no answer. Will attempt in the afternoon

## 2020-12-08 NOTE — PHYSICAL THERAPY INITIAL EVALUATION ADULT - ASR EQUIP NEEDS DISCH PT EVAL
pt has DME at home; may benefit from hospital bed. further recommendations pending mobility assessment

## 2020-12-08 NOTE — PROGRESS NOTE ADULT - ATTENDING COMMENTS
Pt seen and examined bedside. No acute events reported overnight. Appears in NAD, still confused however A & O x 2. Still requiring HFNC, tapered down to 70% FiO2 today. Remdisivr reordered for 5 more days. Still on Decadron. Pt seen and examined bedside. No acute events reported overnight. Appears in NAD, still confused however A & O x 2. Still requiring HFNC, tapered down to 70% FiO2 today. Remdisivr reordered for 5 more days. Still on Decadron.    Son called, no answer.

## 2020-12-08 NOTE — PHYSICAL THERAPY INITIAL EVALUATION ADULT - PERTINENT HX OF CURRENT PROBLEM, REHAB EVAL
89 year old male hx of prostate cancer s/p resection, on no home meds presents with SOB and STINSON getting worsening of symptoms after getting COVID-19. Pt in ED hypoxic on room air now on High flow oxygen. Pt currently a little confused difficult to obtain a hx. Pt admitted for COVID 19 PNA.

## 2020-12-08 NOTE — PROGRESS NOTE ADULT - SUBJECTIVE AND OBJECTIVE BOX
FELIPE AMAYA  89y  Male    Pt seen and examined at bedside this morning.   No acute events reported overnight.   Mental status appears improved, however still confused at times.. able to tell me name and , answers questions reasonably appropriate  Appears in no distress  Does not endorse any complaints  Currently on 50% HFNC    Vital Signs Last 24 Hrs  T(C): 36.8 (08 Dec 2020 09:10), Max: 36.8 (08 Dec 2020 09:10)  T(F): 98.2 (08 Dec 2020 09:10), Max: 98.2 (08 Dec 2020 09:10)  HR: 84 (08 Dec 2020 09:10) (79 - 95)  BP: 153/80 (08 Dec 2020 09:10) (150/73 - 168/83)  BP(mean): --  RR: 18 (08 Dec 2020 09:10) (18 - 18)  SpO2: 94% (08 Dec 2020 09:10) (92% - 98%) 50% HFNC    PHYSICAL EXAM:  GENERAL: Eldery male, fatigues, NAD lying on side  NECK: Supple  CHEST/LUNG: HFNC 50%; normal effort, intermittent coughing. Clear to auscultation bilaterally; No rales, rhonchi, wheezing  HEART: Regular S1/S2 rate and rhythm; No murmurs, rubs, or gallops  ABDOMEN: Soft, Nontender, Nondistended; Bowel sounds present  EXTREMITIES:  2+ Peripheral Pulses, No clubbing, cyanosis, edema, calf tenderness  NEURO:  Alert, and awake oriented to self; No Focal deficits, sensory and motor intact  SKIN: No rashes or lesions, warm, dry      LABS/IMAGING: REVIEWED FELIPE AMAYA  89y  Male    Pt seen and examined at bedside this morning.   No acute events reported overnight.   Mental status appears improved, however still confused at times.. able to tell me name and , answers questions reasonably appropriate  Appears in no distress  Does not endorse any complaints  Currently on 70% HFNC    Vital Signs Last 24 Hrs  T(C): 36.8 (08 Dec 2020 09:10), Max: 36.8 (08 Dec 2020 09:10)  T(F): 98.2 (08 Dec 2020 09:10), Max: 98.2 (08 Dec 2020 09:10)  HR: 84 (08 Dec 2020 09:10) (79 - 95)  BP: 153/80 (08 Dec 2020 09:10) (150/73 - 168/83)  BP(mean): --  RR: 18 (08 Dec 2020 09:10) (18 - 18)  SpO2: 94% (08 Dec 2020 09:10) (92% - 98%) 50% HFNC    PHYSICAL EXAM:  GENERAL: Eldery male, fatigues, NAD lying on side  NECK: Supple  CHEST/LUNG: HFNC 50%; normal effort, intermittent coughing. Clear to auscultation bilaterally; No rales, rhonchi, wheezing  HEART: Regular S1/S2 rate and rhythm; No murmurs, rubs, or gallops  ABDOMEN: Soft, Nontender, Nondistended; Bowel sounds present  EXTREMITIES:  2+ Peripheral Pulses, No clubbing, cyanosis, edema, calf tenderness  NEURO:  Alert, and awake oriented to self; No Focal deficits, sensory and motor intact  SKIN: No rashes or lesions, warm, dry      LABS/IMAGING: REVIEWED

## 2020-12-08 NOTE — PHYSICAL THERAPY INITIAL EVALUATION ADULT - CRITERIA FOR SKILLED THERAPEUTIC INTERVENTIONS
risk reduction/prevention/therapy frequency/anticipated equipment needs at discharge/anticipated discharge recommendation/impairments found/functional limitations in following categories/rehab potential

## 2020-12-08 NOTE — PHYSICAL THERAPY INITIAL EVALUATION ADULT - PASSIVE RANGE OF MOTION EXAMINATION, REHAB EVAL
Right UE Passive ROM was WFL (within functional limits)/bilateral lower extremity Passive ROM was WFL (within functional limits)/pt with left wrist in flexion. c/o pain when attempting to position to neutral, otherwise left UE WFL

## 2020-12-09 LAB
ALBUMIN SERPL ELPH-MCNC: 3.1 G/DL — LOW (ref 3.3–5.2)
ALBUMIN SERPL ELPH-MCNC: 3.1 G/DL — LOW (ref 3.3–5.2)
ALP SERPL-CCNC: 52 U/L — SIGNIFICANT CHANGE UP (ref 40–120)
ALP SERPL-CCNC: 53 U/L — SIGNIFICANT CHANGE UP (ref 40–120)
ALT FLD-CCNC: 32 U/L — SIGNIFICANT CHANGE UP
ALT FLD-CCNC: 34 U/L — SIGNIFICANT CHANGE UP
ANION GAP SERPL CALC-SCNC: 9 MMOL/L — SIGNIFICANT CHANGE UP (ref 5–17)
AST SERPL-CCNC: 24 U/L — SIGNIFICANT CHANGE UP
AST SERPL-CCNC: 25 U/L — SIGNIFICANT CHANGE UP
BILIRUB DIRECT SERPL-MCNC: 0.3 MG/DL — SIGNIFICANT CHANGE UP (ref 0–0.3)
BILIRUB INDIRECT FLD-MCNC: 0.8 MG/DL — SIGNIFICANT CHANGE UP (ref 0.2–1)
BILIRUB SERPL-MCNC: 1 MG/DL — SIGNIFICANT CHANGE UP (ref 0.4–2)
BILIRUB SERPL-MCNC: 1.1 MG/DL — SIGNIFICANT CHANGE UP (ref 0.4–2)
BUN SERPL-MCNC: 51 MG/DL — HIGH (ref 8–20)
CALCIUM SERPL-MCNC: 9.1 MG/DL — SIGNIFICANT CHANGE UP (ref 8.6–10.2)
CHLORIDE SERPL-SCNC: 108 MMOL/L — HIGH (ref 98–107)
CO2 SERPL-SCNC: 24 MMOL/L — SIGNIFICANT CHANGE UP (ref 22–29)
CREAT SERPL-MCNC: 0.86 MG/DL — SIGNIFICANT CHANGE UP (ref 0.5–1.3)
CRP SERPL-MCNC: 2.47 MG/DL — HIGH (ref 0–0.4)
FERRITIN SERPL-MCNC: 482 NG/ML — HIGH (ref 30–400)
GLUCOSE BLDC GLUCOMTR-MCNC: 120 MG/DL — HIGH (ref 70–99)
GLUCOSE BLDC GLUCOMTR-MCNC: 127 MG/DL — HIGH (ref 70–99)
GLUCOSE BLDC GLUCOMTR-MCNC: 142 MG/DL — HIGH (ref 70–99)
GLUCOSE BLDC GLUCOMTR-MCNC: 161 MG/DL — HIGH (ref 70–99)
GLUCOSE SERPL-MCNC: 129 MG/DL — HIGH (ref 70–99)
HCT VFR BLD CALC: 47.9 % — SIGNIFICANT CHANGE UP (ref 39–50)
HGB BLD-MCNC: 15.9 G/DL — SIGNIFICANT CHANGE UP (ref 13–17)
LDH SERPL L TO P-CCNC: 593 U/L — HIGH (ref 98–192)
MCHC RBC-ENTMCNC: 28.2 PG — SIGNIFICANT CHANGE UP (ref 27–34)
MCHC RBC-ENTMCNC: 33.2 GM/DL — SIGNIFICANT CHANGE UP (ref 32–36)
MCV RBC AUTO: 84.9 FL — SIGNIFICANT CHANGE UP (ref 80–100)
PLATELET # BLD AUTO: 294 K/UL — SIGNIFICANT CHANGE UP (ref 150–400)
POTASSIUM SERPL-MCNC: 4.6 MMOL/L — SIGNIFICANT CHANGE UP (ref 3.5–5.3)
POTASSIUM SERPL-SCNC: 4.6 MMOL/L — SIGNIFICANT CHANGE UP (ref 3.5–5.3)
PROT SERPL-MCNC: 6.5 G/DL — LOW (ref 6.6–8.7)
PROT SERPL-MCNC: 6.5 G/DL — LOW (ref 6.6–8.7)
RBC # BLD: 5.64 M/UL — SIGNIFICANT CHANGE UP (ref 4.2–5.8)
RBC # FLD: 13.6 % — SIGNIFICANT CHANGE UP (ref 10.3–14.5)
SODIUM SERPL-SCNC: 141 MMOL/L — SIGNIFICANT CHANGE UP (ref 135–145)
WBC # BLD: 9.41 K/UL — SIGNIFICANT CHANGE UP (ref 3.8–10.5)
WBC # FLD AUTO: 9.41 K/UL — SIGNIFICANT CHANGE UP (ref 3.8–10.5)

## 2020-12-09 PROCEDURE — 93970 EXTREMITY STUDY: CPT | Mod: 26

## 2020-12-09 PROCEDURE — 99233 SBSQ HOSP IP/OBS HIGH 50: CPT

## 2020-12-09 RX ORDER — SODIUM CHLORIDE 9 MG/ML
1000 INJECTION, SOLUTION INTRAVENOUS
Refills: 0 | Status: DISCONTINUED | OUTPATIENT
Start: 2020-12-09 | End: 2020-12-12

## 2020-12-09 RX ADMIN — SODIUM CHLORIDE 75 MILLILITER(S): 9 INJECTION, SOLUTION INTRAVENOUS at 17:11

## 2020-12-09 RX ADMIN — ALBUTEROL 2 PUFF(S): 90 AEROSOL, METERED ORAL at 21:36

## 2020-12-09 RX ADMIN — Medication 500 MILLIGRAM(S): at 05:58

## 2020-12-09 RX ADMIN — Medication 6 MILLIGRAM(S): at 05:58

## 2020-12-09 RX ADMIN — ENOXAPARIN SODIUM 40 MILLIGRAM(S): 100 INJECTION SUBCUTANEOUS at 11:48

## 2020-12-09 RX ADMIN — Medication 2: at 12:00

## 2020-12-09 RX ADMIN — ENOXAPARIN SODIUM 40 MILLIGRAM(S): 100 INJECTION SUBCUTANEOUS at 20:45

## 2020-12-09 NOTE — SWALLOW BEDSIDE ASSESSMENT ADULT - ORAL PREPARATORY PHASE
absent oral grading with no attempt to strip spoon, small amount of puree bolus placed intraorally, however pt with no attempt to form/manipulate bolus. Puree removed from pt's mouth by clinician

## 2020-12-09 NOTE — PROGRESS NOTE ADULT - ASSESSMENT
89y/oM PMH prostate CA s/p resection not on home medications presenting with worsening SOB. Known COVID-19 infection. Seen in [facility] ER on 11/28.     1. Acute hypoxic respiratory failure 2/2 COVID-19 PNA  - ID following  - Remdesivir reordered for another 5 days  - Continue decadron  - Pulm recs appreciated  - Supplemental oxygen prn, wean as tolerated. Currently on 50% HFNC  - Encourage proning, incentive spirometry  - Trend inflammatory markers q48h    2. Transaminitis in setting of COVID-19 and Remdisivir   - Improved  - Monitor    3. Thrombocytopenia   - Likely 2/2 infection, now resolved    4. Metabolic encephalopathy   - Secondary to COVID-19  - Improving    5. Dysphagia screen   - Patient able to swallow thin liquids without issue, however, placed on pureed diet as patient is confused and at risk for aspiration. Will consult SLP for official evaluation, pending. Per son, patient has always had some "throat problem" but has not affected his ability to eat/drink regular/solid foods.   - Aspirations precautions       DVT ppx sq Lovenox  Prognosis, guarded     Advanced Directives: Full code per patient's son, wants everything done.    PT: home with 24/7 assist vs MARY pending progress    Dispo/LOS anticipation: Pending clinical improvement, still on HFNC.     Updated son around 9am this monring, all questions answered. Per son, family would be interested in MARY on discharge.

## 2020-12-09 NOTE — SWALLOW BEDSIDE ASSESSMENT ADULT - SLP PERTINENT HISTORY OF CURRENT PROBLEM
89y/oM PMH prostate CA s/p resection not on home medications presenting with worsening SOB. Known COVID-19 infection. Seen in [facility] ER on 11/28.

## 2020-12-09 NOTE — PROGRESS NOTE ADULT - SUBJECTIVE AND OBJECTIVE BOX
FELIPE AMAYA  89y  Male    Pt seen and examined at bedside this morning.   No acute events reported overnight.   Pt alert to name otherwise appears more lethargic this am. Awakens for exam but easily falls back asleep.   Appears in no distress  Does not endorse any complaints  Currently on 50% HFNC    Vital Signs Last 24 Hrs  T(C): 36.4 (09 Dec 2020 04:46), Max: 36.8 (08 Dec 2020 09:10)  T(F): 97.6 (09 Dec 2020 04:46), Max: 98.3 (08 Dec 2020 20:09)  HR: 80 (09 Dec 2020 04:46) (80 - 87)  BP: 134/74 (09 Dec 2020 04:46) (129/84 - 155/82)  BP(mean): --  RR: 18 (09 Dec 2020 04:46) (18 - 18)  SpO2: 93% (09 Dec 2020 04:46) (92% - 95%) on 50% HFNC    PHYSICAL EXAM:  GENERAL: Eldery male, fatigues, NAD, sleeping in bed  NECK: Supple  CHEST/LUNG: HFNC 50%; normal effort, intermittent coughing. Clear to auscultation bilaterally; No rales, rhonchi, wheezing  HEART: Regular S1/S2 rate and rhythm; No murmurs, rubs, or gallops  ABDOMEN: Soft, Nontender, Nondistended; Bowel sounds present  EXTREMITIES:  2+ Peripheral Pulses, No clubbing, cyanosis, edema, calf tenderness  NEURO:  Alert, and awake oriented to self; No Focal deficits, sensory and motor intact  SKIN: No rashes or lesions, warm, dry      LABS/IMAGING: REVIEWED

## 2020-12-09 NOTE — SWALLOW BEDSIDE ASSESSMENT ADULT - SWALLOW EVAL: DIAGNOSIS
Severe oral dysphagia confounded by lethargy at time  of assessment. Unable to assess pharyngeal stage of swallow 2* no swallow triggered

## 2020-12-09 NOTE — PROGRESS NOTE ADULT - ATTENDING COMMENTS
Pt seen and examined at bedside. Much more lethargic today. HFNC requirements decreasing. Currently on Decadron, Remdisivir discontinued. Seen by SLP, rec NPO due to mental status. Son was updated. Goals of care were revisited as pt is unable to eat at this time, his deconditioning and advanced age. Per son, he would like to wait and see on improvement of mental status and then will decide about further goals of care. Full code for now.

## 2020-12-10 LAB
ALBUMIN SERPL ELPH-MCNC: 2.7 G/DL — LOW (ref 3.3–5.2)
ALP SERPL-CCNC: 43 U/L — SIGNIFICANT CHANGE UP (ref 40–120)
ALT FLD-CCNC: 25 U/L — SIGNIFICANT CHANGE UP
ANION GAP SERPL CALC-SCNC: 10 MMOL/L — SIGNIFICANT CHANGE UP (ref 5–17)
AST SERPL-CCNC: 17 U/L — SIGNIFICANT CHANGE UP
BILIRUB DIRECT SERPL-MCNC: 0.3 MG/DL — SIGNIFICANT CHANGE UP (ref 0–0.3)
BILIRUB INDIRECT FLD-MCNC: 0.7 MG/DL — SIGNIFICANT CHANGE UP (ref 0.2–1)
BILIRUB SERPL-MCNC: 1 MG/DL — SIGNIFICANT CHANGE UP (ref 0.4–2)
BUN SERPL-MCNC: 52 MG/DL — HIGH (ref 8–20)
CALCIUM SERPL-MCNC: 8.6 MG/DL — SIGNIFICANT CHANGE UP (ref 8.6–10.2)
CHLORIDE SERPL-SCNC: 110 MMOL/L — HIGH (ref 98–107)
CO2 SERPL-SCNC: 21 MMOL/L — LOW (ref 22–29)
CREAT SERPL-MCNC: 0.7 MG/DL — SIGNIFICANT CHANGE UP (ref 0.5–1.3)
GLUCOSE BLDC GLUCOMTR-MCNC: 120 MG/DL — HIGH (ref 70–99)
GLUCOSE BLDC GLUCOMTR-MCNC: 124 MG/DL — HIGH (ref 70–99)
GLUCOSE BLDC GLUCOMTR-MCNC: 134 MG/DL — HIGH (ref 70–99)
GLUCOSE BLDC GLUCOMTR-MCNC: 156 MG/DL — HIGH (ref 70–99)
GLUCOSE SERPL-MCNC: 151 MG/DL — HIGH (ref 70–99)
POTASSIUM SERPL-MCNC: 3.8 MMOL/L — SIGNIFICANT CHANGE UP (ref 3.5–5.3)
POTASSIUM SERPL-SCNC: 3.8 MMOL/L — SIGNIFICANT CHANGE UP (ref 3.5–5.3)
PROT SERPL-MCNC: 5.8 G/DL — LOW (ref 6.6–8.7)
SODIUM SERPL-SCNC: 141 MMOL/L — SIGNIFICANT CHANGE UP (ref 135–145)

## 2020-12-10 PROCEDURE — 71045 X-RAY EXAM CHEST 1 VIEW: CPT | Mod: 26

## 2020-12-10 PROCEDURE — 99233 SBSQ HOSP IP/OBS HIGH 50: CPT

## 2020-12-10 RX ADMIN — ALBUTEROL 2 PUFF(S): 90 AEROSOL, METERED ORAL at 08:20

## 2020-12-10 RX ADMIN — ALBUTEROL 2 PUFF(S): 90 AEROSOL, METERED ORAL at 21:10

## 2020-12-10 RX ADMIN — ENOXAPARIN SODIUM 40 MILLIGRAM(S): 100 INJECTION SUBCUTANEOUS at 21:15

## 2020-12-10 RX ADMIN — ENOXAPARIN SODIUM 40 MILLIGRAM(S): 100 INJECTION SUBCUTANEOUS at 07:45

## 2020-12-10 RX ADMIN — Medication 6 MILLIGRAM(S): at 05:20

## 2020-12-10 RX ADMIN — ALBUTEROL 2 PUFF(S): 90 AEROSOL, METERED ORAL at 14:50

## 2020-12-10 NOTE — PROGRESS NOTE ADULT - SUBJECTIVE AND OBJECTIVE BOX
FELIPE AMAYA  89y  Male    Interval/overnight events/pt complaints:  Pt seen and examined at bedside this morning. No acute events reported overnight. Mental status appears improved, however still confused. Appears in no distress. Does not endorse any complaints. Still on HFNC    MEDICATIONS  (STANDING):  ALBUTerol    90 MICROgram(s) HFA Inhaler 2 Puff(s) Inhalation every 6 hours  ascorbic acid 500 milliGRAM(s) Oral every 12 hours  cholecalciferol 2000 Unit(s) Oral daily  dexAMETHasone  Injectable 6 milliGRAM(s) IV Push daily  enoxaparin Injectable 40 milliGRAM(s) SubCutaneous every 12 hours  remdesivir  IVPB   IV Intermittent   remdesivir  IVPB 100 milliGRAM(s) IV Intermittent every 24 hours  zinc sulfate 220 milliGRAM(s) Oral daily    Vital Signs Last 24 Hrs  T(C): 36.5 (04 Dec 2020 04:21), Max: 36.8 (03 Dec 2020 23:49)  T(F): 97.7 (04 Dec 2020 04:21), Max: 98.2 (03 Dec 2020 23:49)  HR: 72 (04 Dec 2020 04:21) (72 - 81)  BP: 156/70 (04 Dec 2020 04:21) (124/68 - 156/70)  BP(mean): --  RR: 18 (04 Dec 2020 04:21) (18 - 18)  SpO2: 96% (04 Dec 2020 04:21) (91% - 96%)  I&O's Summary    PHYSICAL EXAM:  GENERAL: Eldery male, fatigues, NAD lying on side  NECK: Supple  CHEST/LUNG: HFNC 100%; normal effort, intermittent coughing. Clear to auscultation bilaterally; No rales, rhonchi, wheezing  HEART: Regular S1/S2 rate and rhythm; No murmurs, rubs, or gallops  ABDOMEN: Soft, Nontender, Nondistended; Bowel sounds present  EXTREMITIES:  2+ Peripheral Pulses, No clubbing, cyanosis, edema, calf tenderness  NEURO:  Alert, and awake orented to self; No Focal deficits, sensory and motor intact  SKIN: No rashes or lesions, warm, dry    CAPILLARY BLOOD GLUCOSE      POCT Blood Glucose.: 131 mg/dL (04 Dec 2020 10:36)  POCT Blood Glucose.: 121 mg/dL (04 Dec 2020 06:56)  POCT Blood Glucose.: 147 mg/dL (04 Dec 2020 00:09)  POCT Blood Glucose.: 130 mg/dL (03 Dec 2020 16:57)      LABS    (12-04 @ 06:23)                      14.2  9.63 )-----------( 193                 42.6    Neutrophils = 8.11 (84.2%)  Lymphocytes = 0.59 (6.1%)  Eosinophils = 0.00 (0.0%)  Basophils = 0.00 (0.0%)  Monocytes = 0.93 (9.7%)  Bands = --%    12-04    138  |  104  |  41.0<H>  ----------------------------<  130<H>  3.5   |  22.0  |  0.68    Ca    8.5<L>      04 Dec 2020 06:23  Mg     2.3     12-04    TPro  5.7<L>  /  Alb  2.9<L>  /  TBili  0.6  /  DBili  0.2  /  AST  66<H>  /  ALT  42<H>  /  AlkPhos  38<L>  12-04    Hepatic Function Panel (12.04.20 @ 06:23)   Indirect Reacting Bilirubin: 0.4 mg/dL   Protein Total, Serum: 6.0 g/dL   Albumin, Serum: 3.0 g/dL   Bilirubin Total, Serum: 0.6 mg/dL   Bilirubin Direct, Serum: 0.2 mg/dL   Alkaline Phosphatase, Serum: 39 U/L   Aspartate Aminotransferase (AST/SGOT): 66 U/L   Alanine Aminotransferase (ALT/SGPT): 44 U/L   Procalcitonin, Serum (12.04.20 @ 06:23)   Procalcitonin, Serum: 0.14: Reference range change as of 3/21/2019 ng/mL   Lactate Dehydrogenase, Serum: 424 U/L (12.04.20 @ 06:23)   Ferritin, Serum: 665 ng/mL (12.04.20 @ 06:23)   C-Reactive Protein, Serum: 4.20 mg/dL (12.04.20 @ 06:23)   Sedimentation Rate, Erythrocyte: 20 mm/hr (12.04.20 @ 06:23)     IMAGING    Consultant(s) Notes Reviewed:  [x ] YES  [ ] NO FELIPE AMAYA  89y  Male    Interval/overnight events/pt complaints:  Pt seen and examined at bedside this morning. No acute events reported overnight. Patient still very lethargic, denies any complaints, and is irritable when awoken. Still on HFNC, being weaned off.     MEDICATIONS  (STANDING):  ALBUTerol    90 MICROgram(s) HFA Inhaler 2 Puff(s) Inhalation every 6 hours  ascorbic acid 500 milliGRAM(s) Oral every 12 hours  cholecalciferol 2000 Unit(s) Oral daily  dexAMETHasone  Injectable 6 milliGRAM(s) IV Push daily  enoxaparin Injectable 40 milliGRAM(s) SubCutaneous every 12 hours  remdesivir  IVPB   IV Intermittent   remdesivir  IVPB 100 milliGRAM(s) IV Intermittent every 24 hours  zinc sulfate 220 milliGRAM(s) Oral daily    Vital Signs Last 24 Hrs  T(C): 36.5 (04 Dec 2020 04:21), Max: 36.8 (03 Dec 2020 23:49)  T(F): 97.7 (04 Dec 2020 04:21), Max: 98.2 (03 Dec 2020 23:49)  HR: 72 (04 Dec 2020 04:21) (72 - 81)  BP: 156/70 (04 Dec 2020 04:21) (124/68 - 156/70)  BP(mean): --  RR: 18 (04 Dec 2020 04:21) (18 - 18)  SpO2: 96% (04 Dec 2020 04:21) (91% - 96%)  I&O's Summary    PHYSICAL EXAM:  GENERAL: Elderly male, fatigued, lethargic, NAD  NECK: Supple  CHEST/LUNG:  normal effort, intermittent coughing. Clear to auscultation bilaterally; No rales, rhonchi, wheezing  HEART: Regular S1/S2 rate and rhythm; No murmurs, rubs, or gallops  ABDOMEN: Soft, Nontender, Nondistended; Bowel sounds present  EXTREMITIES:  2+ Peripheral Pulses, No clubbing, cyanosis, edema, calf tenderness  NEURO:  A&O x 1, does not know he is in the hospital or the year. Lethargic.   SKIN: No rashes or lesions, warm, dry    CAPILLARY BLOOD GLUCOSE      POCT Blood Glucose.: 131 mg/dL (04 Dec 2020 10:36)  POCT Blood Glucose.: 121 mg/dL (04 Dec 2020 06:56)  POCT Blood Glucose.: 147 mg/dL (04 Dec 2020 00:09)  POCT Blood Glucose.: 130 mg/dL (03 Dec 2020 16:57)      LABS    (12-04 @ 06:23)                      14.2  9.63 )-----------( 193                 42.6    Neutrophils = 8.11 (84.2%)  Lymphocytes = 0.59 (6.1%)  Eosinophils = 0.00 (0.0%)  Basophils = 0.00 (0.0%)  Monocytes = 0.93 (9.7%)  Bands = --%    12-04    138  |  104  |  41.0<H>  ----------------------------<  130<H>  3.5   |  22.0  |  0.68    Ca    8.5<L>      04 Dec 2020 06:23  Mg     2.3     12-04    TPro  5.7<L>  /  Alb  2.9<L>  /  TBili  0.6  /  DBili  0.2  /  AST  66<H>  /  ALT  42<H>  /  AlkPhos  38<L>  12-04    Hepatic Function Panel (12.04.20 @ 06:23)   Indirect Reacting Bilirubin: 0.4 mg/dL   Protein Total, Serum: 6.0 g/dL   Albumin, Serum: 3.0 g/dL   Bilirubin Total, Serum: 0.6 mg/dL   Bilirubin Direct, Serum: 0.2 mg/dL   Alkaline Phosphatase, Serum: 39 U/L   Aspartate Aminotransferase (AST/SGOT): 66 U/L   Alanine Aminotransferase (ALT/SGPT): 44 U/L   Procalcitonin, Serum (12.04.20 @ 06:23)   Procalcitonin, Serum: 0.14: Reference range change as of 3/21/2019 ng/mL   Lactate Dehydrogenase, Serum: 424 U/L (12.04.20 @ 06:23)   Ferritin, Serum: 665 ng/mL (12.04.20 @ 06:23)   C-Reactive Protein, Serum: 4.20 mg/dL (12.04.20 @ 06:23)   Sedimentation Rate, Erythrocyte: 20 mm/hr (12.04.20 @ 06:23)     IMAGING    Consultant(s) Notes Reviewed:  [x ] YES  [ ] NO

## 2020-12-10 NOTE — PROGRESS NOTE ADULT - ASSESSMENT
89y/oM PMH prostate CA s/p resection not on home medications presenting with worsening SOB. Known COVID-19 infection. Seen in [facility] ER on 11/28.     #Acute hypoxic respiratory failure 2/2 COVID-19 PNA  - ID following  - S/p Remdsivir  - C/w Decadron  - Pulm recs appreciated  - Supplemental oxygen prn, wean as tolerated. Currently on 50% HFNC, will attempt to wean off.  - Encourage proning, incentive spirometry    #Metabolic encephalopathy in setting of infection, failure to thrive  - Pt extremely weak, fragile,  not participating with feeds  - Discussed w/ son Esteban regarding patients continued lethargy and SLP recs. Will order NGT and monitor for improvement of mental status. Dietician consult placed. He is open to Palliative consult re-exploring goals of care and options in case of no improvement. Palliative consult placed.       #Transaminitis in setting of COVID-19 and Remdisivir   - Improved  - Monitor    #Thrombocytopenia   - Likely 2/2 infection, now resolved    DVT ppx sq Lovenox    Prognosis, guarded     Advanced Directives: Full code for now. Palliative consult placed given continued failure to thrive.     PT: home with 24/7 assist vs MARY pending progress    Dispo/LOS anticipation: Pending clinical improvement, still on HFNC   89y/oM PMH prostate CA s/p resection not on home medications presenting with worsening SOB. Known COVID-19 infection. Seen in [facility] ER on 11/28.     #Acute hypoxic respiratory failure 2/2 COVID-19 PNA  - ID following  - S/p Remdsivir  - C/w Decadron  - Pulm recs appreciated  - Supplemental oxygen prn, wean as tolerated. Currently on 50% HFNC, will attempt to wean off.  - Encourage proning, incentive spirometry, OOB    #Metabolic encephalopathy in setting of infection, failure to thrive  - Pt extremely weak, fragile,  not participating with feeds  - Discussed w/ son Esteban regarding patients continued lethargy and SLP recs of NPO. Will order NGT and monitor for improvement of mental status. Dietician consult placed. He is open to Palliative consult re-exploring goals of care and options in case of no improvement. Palliative consult placed.       #Transaminitis in setting of COVID-19 and Remdisivir   - Improved  - Monitor    #Thrombocytopenia   - Likely 2/2 infection, now resolved    DVT ppx sq Lovenox    Prognosis, guarded     Advanced Directives: Full code for now. Palliative consult placed given continued failure to thrive.     PT: home with 24/7 assist vs MARY pending progress    Dispo/LOS anticipation: Pending clinical improvement, still on HFNC - attempting to wean off.

## 2020-12-11 DIAGNOSIS — Z51.5 ENCOUNTER FOR PALLIATIVE CARE: ICD-10-CM

## 2020-12-11 DIAGNOSIS — R41.82 ALTERED MENTAL STATUS, UNSPECIFIED: ICD-10-CM

## 2020-12-11 DIAGNOSIS — U07.1 COVID-19: ICD-10-CM

## 2020-12-11 DIAGNOSIS — R53.81 OTHER MALAISE: ICD-10-CM

## 2020-12-11 LAB
ALBUMIN SERPL ELPH-MCNC: 2.7 G/DL — LOW (ref 3.3–5.2)
ALBUMIN SERPL ELPH-MCNC: 2.8 G/DL — LOW (ref 3.3–5.2)
ALP SERPL-CCNC: 53 U/L — SIGNIFICANT CHANGE UP (ref 40–120)
ALP SERPL-CCNC: 57 U/L — SIGNIFICANT CHANGE UP (ref 40–120)
ALT FLD-CCNC: 33 U/L — SIGNIFICANT CHANGE UP
ALT FLD-CCNC: 33 U/L — SIGNIFICANT CHANGE UP
ANION GAP SERPL CALC-SCNC: 8 MMOL/L — SIGNIFICANT CHANGE UP (ref 5–17)
AST SERPL-CCNC: 32 U/L — SIGNIFICANT CHANGE UP
AST SERPL-CCNC: 33 U/L — SIGNIFICANT CHANGE UP
BILIRUB DIRECT SERPL-MCNC: 0.4 MG/DL — HIGH (ref 0–0.3)
BILIRUB INDIRECT FLD-MCNC: 0.9 MG/DL — SIGNIFICANT CHANGE UP (ref 0.2–1)
BILIRUB SERPL-MCNC: 1.2 MG/DL — SIGNIFICANT CHANGE UP (ref 0.4–2)
BILIRUB SERPL-MCNC: 1.3 MG/DL — SIGNIFICANT CHANGE UP (ref 0.4–2)
BUN SERPL-MCNC: 49 MG/DL — HIGH (ref 8–20)
CALCIUM SERPL-MCNC: 8.7 MG/DL — SIGNIFICANT CHANGE UP (ref 8.6–10.2)
CHLORIDE SERPL-SCNC: 112 MMOL/L — HIGH (ref 98–107)
CO2 SERPL-SCNC: 22 MMOL/L — SIGNIFICANT CHANGE UP (ref 22–29)
CREAT SERPL-MCNC: 0.58 MG/DL — SIGNIFICANT CHANGE UP (ref 0.5–1.3)
GLUCOSE BLDC GLUCOMTR-MCNC: 114 MG/DL — HIGH (ref 70–99)
GLUCOSE BLDC GLUCOMTR-MCNC: 137 MG/DL — HIGH (ref 70–99)
GLUCOSE BLDC GLUCOMTR-MCNC: 140 MG/DL — HIGH (ref 70–99)
GLUCOSE BLDC GLUCOMTR-MCNC: 144 MG/DL — HIGH (ref 70–99)
GLUCOSE SERPL-MCNC: 127 MG/DL — HIGH (ref 70–99)
HCT VFR BLD CALC: 47 % — SIGNIFICANT CHANGE UP (ref 39–50)
HGB BLD-MCNC: 15.6 G/DL — SIGNIFICANT CHANGE UP (ref 13–17)
MCHC RBC-ENTMCNC: 28.6 PG — SIGNIFICANT CHANGE UP (ref 27–34)
MCHC RBC-ENTMCNC: 33.2 GM/DL — SIGNIFICANT CHANGE UP (ref 32–36)
MCV RBC AUTO: 86.1 FL — SIGNIFICANT CHANGE UP (ref 80–100)
PLATELET # BLD AUTO: 245 K/UL — SIGNIFICANT CHANGE UP (ref 150–400)
POTASSIUM SERPL-MCNC: 3.8 MMOL/L — SIGNIFICANT CHANGE UP (ref 3.5–5.3)
POTASSIUM SERPL-SCNC: 3.8 MMOL/L — SIGNIFICANT CHANGE UP (ref 3.5–5.3)
PROT SERPL-MCNC: 5.8 G/DL — LOW (ref 6.6–8.7)
PROT SERPL-MCNC: 5.9 G/DL — LOW (ref 6.6–8.7)
RBC # BLD: 5.46 M/UL — SIGNIFICANT CHANGE UP (ref 4.2–5.8)
RBC # FLD: 13.7 % — SIGNIFICANT CHANGE UP (ref 10.3–14.5)
SODIUM SERPL-SCNC: 142 MMOL/L — SIGNIFICANT CHANGE UP (ref 135–145)
WBC # BLD: 9.22 K/UL — SIGNIFICANT CHANGE UP (ref 3.8–10.5)
WBC # FLD AUTO: 9.22 K/UL — SIGNIFICANT CHANGE UP (ref 3.8–10.5)

## 2020-12-11 PROCEDURE — 99233 SBSQ HOSP IP/OBS HIGH 50: CPT

## 2020-12-11 PROCEDURE — 99221 1ST HOSP IP/OBS SF/LOW 40: CPT

## 2020-12-11 RX ADMIN — Medication 2000 UNIT(S): at 12:06

## 2020-12-11 RX ADMIN — ALBUTEROL 2 PUFF(S): 90 AEROSOL, METERED ORAL at 03:30

## 2020-12-11 RX ADMIN — ENOXAPARIN SODIUM 40 MILLIGRAM(S): 100 INJECTION SUBCUTANEOUS at 12:05

## 2020-12-11 RX ADMIN — Medication 500 MILLIGRAM(S): at 17:36

## 2020-12-11 RX ADMIN — ALBUTEROL 2 PUFF(S): 90 AEROSOL, METERED ORAL at 09:22

## 2020-12-11 RX ADMIN — ZINC SULFATE TAB 220 MG (50 MG ZINC EQUIVALENT) 220 MILLIGRAM(S): 220 (50 ZN) TAB at 12:06

## 2020-12-11 RX ADMIN — ENOXAPARIN SODIUM 40 MILLIGRAM(S): 100 INJECTION SUBCUTANEOUS at 22:34

## 2020-12-11 RX ADMIN — Medication 6 MILLIGRAM(S): at 05:33

## 2020-12-11 NOTE — PROGRESS NOTE ADULT - ASSESSMENT
89y/oM PMH prostate CA s/p resection not on home medications presenting with worsening SOB. Known COVID-19 infection. Seen in [facility] ER on 11/28.     #Acute hypoxic respiratory failure 2/2 COVID-19 PNA  - ID following  - S/p Remdsivir  - C/w Decadron  - Pulm recs appreciated  - Supplemental oxygen prn, wean as tolerated. Currently on 50% HFNC, will attempt to wean off.  - Encourage proning, incentive spirometry, OOB    #Metabolic encephalopathy in setting of infection, failure to thrive  - Pt extremely weak, fragile,  not participating with feeds  - Discussed w/ son Esteban regarding patients continued lethargy and SLP recs of NPO. Will order NGT and monitor for improvement of mental status. Dietician consult placed. He is open to Palliative consult re-exploring goals of care and options in case of no improvement. Palliative consult placed.       #Transaminitis in setting of COVID-19 and Remdisivir   - Improved  - Monitor    #Thrombocytopenia   - Likely 2/2 infection, now resolved    DVT ppx sq Lovenox    Prognosis, guarded     Advanced Directives: Full code for now. Palliative consult placed given continued failure to thrive. 89y/oM PMH prostate CA s/p resection not on home medications presenting with worsening SOB. Known COVID-19 infection. Seen in [facility] ER on 11/28.     #Acute hypoxic respiratory failure 2/2 COVID-19 PNA  - ID following  - C/w Decadron  - Pulmonary following  - Supplemental oxygen prn, wean as tolerated. Off 60% HFNC, attempted to wean off- Successful Now on NC 6L O2 sat= 100%  - Encourage proning, incentive spirometry, OOB  - PT following    #Metabolic encephalopathy in setting of infection, failure to thrive  - Pt extremely weak, fragile,  not participating with feeds  - Discussed w/ son Esteban regarding patients continued lethargy and SLP recs of NPO.   - Now with NGT and monitored for improvement of mental status. Dietician consult placed.   - Palliative consult re-exploring goals of care and options in case of no improvement.     #Transaminitis in setting of COVID-19 and Remdisivir   - Improved  - Monitor    #Thrombocytopenia   - Likely 2/2 infection, now resolved    DVT ppx sq Lovenox    Prognosis, guarded     Advanced Directives: Full code for now. Palliative consult  given continued failure to thrive re-exploring goals of care and options in case of no improvement..   PT is working w/pt  Discussed with son Asa plan of care, at length was appreciative, answered all the questions. 89y/oM PMH prostate CA s/p resection not on home medications presenting with worsening SOB. Known COVID-19 infection. Seen in [facility] ER on 11/28.     #Acute hypoxic respiratory failure 2/2 COVID-19 PNA  - ID following  - C/w Decadron  - Pulmonary following  - Supplemental oxygen prn, wean as tolerated. Off 60% HFNC, attempted to wean off- Successful Now on NC 6L O2 sat= 100%  - Encourage proning, incentive spirometry, OOB  - PT following    #Metabolic encephalopathy in setting of infection, failure to thrive  - Pt extremely weak, fragile,  not participating with feeds  - Discussed w/ son Esteban regarding patients continued lethargy and SLP recs of NPO.   - Now with NGT and monitored for improvement of mental status. Dietician consult placed.   - Palliative consult re-exploring goals of care and options in case of no improvement.     #Transaminitis in setting of COVID-19 and Remdisivir   - Improved  - Monitor    #Thrombocytopenia   - Likely 2/2 infection, now resolved    DVT ppx sq Lovenox    Prognosis, guarded     Advanced Directives: Full code for now. Palliative consult  given continued failure to thrive re-exploring goals of care and options in case of no improvement..   PT is working w/pt  Discussed with son Asa plan of care, at length was appreciative, answered all the questions.   Per son's request called pt's daughter Kerri in [place] and updated with POC [phone]. All questions answered. The daughter is very appreciative.

## 2020-12-11 NOTE — PROGRESS NOTE ADULT - ATTENDING COMMENTS
89 year old male with PMH Prostate CA s/p resection presented with worsening dyspnea and Hypoxic to 89% with multifocal pneumonia on Chest X-Ray, COVID-19 pneumonia. Hospital course significant for Respiratory failure requiring HFNC completed Remdesivir. Dysphagia - NPO on TF.    Chart reviewed since admission. Patient seen, examined at bedside for above.  Monosyllabic responses, denies any symptoms. On HFNC, without worsening WOB.    Wean off HFNC, Continue Decadron for now, Albuterol  Continue TF, SLP follow up  Guarded Prognosis - remains full code as noted by Hospitalist; Enlist palliative care assistance for [facility]

## 2020-12-11 NOTE — CONSULT NOTE ADULT - PROBLEM SELECTOR RECOMMENDATION 9
ID consulted and following - continue with recommendations for interventions  Pulm consulted and following continue with recs  Monitor fever and inflammatory marker trends  on HIGH FLOW NC   NPO

## 2020-12-11 NOTE — CHART NOTE - NSCHARTNOTEFT_GEN_A_CORE
Source: Patient [ ]  Family [ ]   other [x ]    Current Diet: Diet, NPO with Tube Feed:   Tube Feeding Modality: Nasogastric  Jevity 1.5 Manpreet (JEVITY1.5)  Total Volume for 24 Hours (mL): 960  Continuous  Starting Tube Feed Rate {mL per Hour}: 10  Increase Tube Feed Rate by (mL): 10     Every 4 hours  Until Goal Tube Feed Rate (mL per Hour): 40  Tube Feed Duration (in Hours): 24  Tube Feed Start Time: 16:00 (12-10-20 @ 16:20)    Enteral Nutrition: Jevity @ 40ml/hr x 20 hrs daily provides 800ml, 1200 kcal, 51g protein daily    Current Weight:   (12/2) 175 lbs    % Weight Change no new weight to assess, will continue to monitor.     Pertinent Medications: MEDICATIONS  (STANDING):  ALBUTerol    90 MICROgram(s) HFA Inhaler 2 Puff(s) Inhalation every 6 hours  ascorbic acid 500 milliGRAM(s) Oral every 12 hours  cholecalciferol 2000 Unit(s) Oral daily  dexAMETHasone  Injectable 6 milliGRAM(s) IV Push daily  dextrose 40% Gel 15 Gram(s) Oral once  dextrose 5% + lactated ringers. 1000 milliLiter(s) (75 mL/Hr) IV Continuous <Continuous>  dextrose 5%. 1000 milliLiter(s) (50 mL/Hr) IV Continuous <Continuous>  dextrose 5%. 1000 milliLiter(s) (100 mL/Hr) IV Continuous <Continuous>  dextrose 50% Injectable 25 Gram(s) IV Push once  dextrose 50% Injectable 12.5 Gram(s) IV Push once  dextrose 50% Injectable 25 Gram(s) IV Push once  enoxaparin Injectable 40 milliGRAM(s) SubCutaneous every 12 hours  glucagon  Injectable 1 milliGRAM(s) IntraMuscular once  insulin lispro (ADMELOG) corrective regimen sliding scale   SubCutaneous three times a day before meals  zinc sulfate 220 milliGRAM(s) Oral daily    MEDICATIONS  (PRN):  haloperidol    Injectable 2 milliGRAM(s) IntraMuscular every 6 hours PRN Agitation    Pertinent Labs: CBC Full  -  ( 11 Dec 2020 08:47 )  WBC Count : 9.22 K/uL  RBC Count : 5.46 M/uL  Hemoglobin : 15.6 g/dL  Hematocrit : 47.0 %  Platelet Count - Automated : 245 K/uL  Mean Cell Volume : 86.1 fl  Mean Cell Hemoglobin : 28.6 pg  Mean Cell Hemoglobin Concentration : 33.2 gm/dL  12-11 Na142 mmol/L Glu 127 mg/dL<H> K+ 3.8 mmol/L Cr  0.58 mg/dL BUN 49.0 mg/dL<H> Phos n/a   Alb 2.8 g/dL<L> PAB n/a       Skin: no skin breakdown noted     Current Nutrition Diagnosis: Pt remains at nutrition risk secondary to increased nutrient needs related to increased physiologic demand for nutrient as evidenced by pt with acute respiratory failure with hypoxia 2/2 +COVID19 PNA.  Pt now unable to tolerate po due to failed swallow evaluations.  Pt currently receiving Jevity @ 40ml/hr.  Aware Palliative care following- plan may be for discussion about PEG vs. comfort feeds.      Recommendations:   1. RD to honor pt/family wishes regarding nutrition (enteral vs. comfort feeds)  2. Consider increasing Jevity 1.5 by 10ml q6 hrs to goal rate of 60ml/hr x 20 hrs daily (1200ml, 1800 kcal, 77g protein)   3. Continue with Vit C, Vit D daily  4. Monitor daily wts     Monitoring and Evaluation:   [ ] PO intake [ x] Tolerance to diet prescription [X] Weights  [X] Follow up per protocol [X] Labs:

## 2020-12-11 NOTE — CONSULT NOTE ADULT - PROBLEM SELECTOR RECOMMENDATION 4
Discussed case with Dr. Odom  Prior conversations held with patient's son Esteban High regarding GOC - patient Full Code- he is intermittently lethargic - failing swallow eval - SLP recommends NPO- Patient with NGT now ordered yesterday - may need to discuss possibility of PEG vs comfort feeds  - Patient having difficulty weaning off high flow NC as well  - Will update document after attempting to reach Esteban Discussed case with Dr. Odom  Prior conversations held with patient's son Esteban High regarding GOC - patient Full Code- he is intermittently lethargic - failing swallow eval - SLP recommends NPO- Patient with NGT now ordered yesterday - may need to discuss possibility of PEG vs comfort feeds  - Patient having difficulty weaning off high flow NC as well  - Will update document after attempting to reach Fidel Discussed case with Dr. Odom  Prior conversations held with patient's son Esteban High regarding GOC - patient Full Code- he is intermittently lethargic - failing swallow eval - SLP recommends NPO- Patient with NGT now ordered yesterday - may need to discuss possibility of PEG vs comfort feeds  - Patient having difficulty weaning off high flow NC as well  - Update 1215pm: Attempted to call Fidel several times, no success. Will try again later.    Total Time Spent_30___ minutes  This includes chart review, patient assessment, discussion and collaboration with interdisciplinary team members, ACP planning    Thank you for the opportunity to assist with the care of this patient.   [facility] [phone]. Discussed case with Dr. Odom  Prior conversations held with patient's son Esteban High regarding GOC - patient Full Code- he is intermittently lethargic - failing swallow eval - SLP recommends NPO- Patient with NGT now ordered yesterday - may need to discuss possibility of PEG vs comfort feeds  - Patient having difficulty weaning off high flow NC as well  - Update 1215pm: Attempted to call Fidel several times, no success. Will try again later.  Update 345 PM: After multiple attempts, unable to reach Fidel. Spoke to Nita SMYTH from [facility] caring for patient - she was successful in reaching Fidel. She spoke to him in detail regarding his father's status and inability to swallow. Fidel was receptive. I anticipate Fidel will need further time to see if his father will have any improvement in his cognitive status before making any definitive decisions regarding PEG vs Comfort feeds.  Total Time Spent_30___ minutes  This includes chart review, patient assessment, discussion and collaboration with interdisciplinary team members, ACP planning    Thank you for the opportunity to assist with the care of this patient.   [facility] Consult [facility] [phone].

## 2020-12-12 LAB
ALBUMIN SERPL ELPH-MCNC: 2.9 G/DL — LOW (ref 3.3–5.2)
ALP SERPL-CCNC: 71 U/L — SIGNIFICANT CHANGE UP (ref 40–120)
ALT FLD-CCNC: 70 U/L — HIGH
AST SERPL-CCNC: 67 U/L — HIGH
BILIRUB DIRECT SERPL-MCNC: 0.3 MG/DL — SIGNIFICANT CHANGE UP (ref 0–0.3)
BILIRUB INDIRECT FLD-MCNC: 0.9 MG/DL — SIGNIFICANT CHANGE UP (ref 0.2–1)
BILIRUB SERPL-MCNC: 1.2 MG/DL — SIGNIFICANT CHANGE UP (ref 0.4–2)
CREAT SERPL-MCNC: 0.62 MG/DL — SIGNIFICANT CHANGE UP (ref 0.5–1.3)
PROT SERPL-MCNC: 6.1 G/DL — LOW (ref 6.6–8.7)

## 2020-12-12 PROCEDURE — 99233 SBSQ HOSP IP/OBS HIGH 50: CPT

## 2020-12-12 RX ORDER — ENOXAPARIN SODIUM 100 MG/ML
40 INJECTION SUBCUTANEOUS DAILY
Refills: 0 | Status: DISCONTINUED | OUTPATIENT
Start: 2020-12-12 | End: 2020-12-18

## 2020-12-12 RX ADMIN — ALBUTEROL 2 PUFF(S): 90 AEROSOL, METERED ORAL at 08:00

## 2020-12-12 RX ADMIN — Medication 2000 UNIT(S): at 14:24

## 2020-12-12 RX ADMIN — Medication 500 MILLIGRAM(S): at 18:27

## 2020-12-12 RX ADMIN — ENOXAPARIN SODIUM 40 MILLIGRAM(S): 100 INJECTION SUBCUTANEOUS at 14:24

## 2020-12-12 RX ADMIN — Medication 6 MILLIGRAM(S): at 05:33

## 2020-12-12 RX ADMIN — Medication 500 MILLIGRAM(S): at 05:33

## 2020-12-12 NOTE — PROGRESS NOTE ADULT - ASSESSMENT
89y/oM PMH prostate CA s/p resection not on home medications presenting with worsening SOB. Known COVID-19 infection. Seen in [facility] ER on 11/28.     #Acute hypoxic respiratory failure 2/2 COVID-19 PNA - still hypoxemic but on 5LNC from HFNC  - ID following  - C/w Decadron  - Pulmonary following  - Supplemental oxygen prn, wean as tolerated. Albuterol MDI  - Encourage proning, incentive spirometry, OOB, Chest PT  - PT following    #Metabolic encephalopathy in setting of infection, failure to thrive  - Pt extremely weak, fragile,  not participating with feeds  - Previously discussed w/ son Esteban regarding patients continued lethargy and SLP recs of NPO.  Continues to be recommended NPO by SLP  - Now with NGT and monitored for improvement of mental status. Continue TF  - Palliative consult re-exploring goals of care and options in case of no improvement.     #Transaminitis in setting of COVID-19 and Remdisivir   - Improved  - Monitor    #Thrombocytopenia   - Likely 2/2 infection, now resolved    DVT ppx sq Lovenox    Prognosis, guarded     Advanced Directives: Full code for now. Palliative consult  given continued failure to thrive re-exploring goals of care and options in case of no improvement..   PT is working w/pt  Previously discussed with son Asa plan of care, at length was appreciative, answered all the questions. Per son's request called pt's daughter Kerri in [place] and updated with POC [phone]. All questions answered.

## 2020-12-12 NOTE — PROGRESS NOTE ADULT - SUBJECTIVE AND OBJECTIVE BOX
HOSPITALIST PROGRESS NOTE    FELIPE AMAYA  [id number]  89yMale    Patient is a 89y old  Male who presents with a chief complaint of COVID PNA (11 Dec 2020 12:04)      SUBJECTIVE:   Chart reviewed since last visit.  Patient seen and examined at bedside for AHRF, COVID-19 Pneumonia.  Sleeping mostly - no agitation.  Down to 5L NC.      OBJECTIVE:  Vital Signs Last 24 Hrs  T(C): 36.8 (12 Dec 2020 16:38), Max: 37.1 (12 Dec 2020 08:37)  T(F): 98.2 (12 Dec 2020 16:38), Max: 98.8 (12 Dec 2020 08:37)  HR: 72 (12 Dec 2020 16:38) (72 - 95)  BP: 126/77 (12 Dec 2020 16:38) (118/71 - 158/79)   RR: 18 (12 Dec 2020 16:38) (18 - 18)  SpO2: 95% (12 Dec 2020 16:38) (95% - 100%)    PHYSICAL EXAMINATION  General: Elderly male lying slouched in bed sleeping, NAD  HEENT:  opens eyes to verba stimulus  NECK:  supple  CVS: regular rate and rhythm S1 S2  RESP:  Poor effort - CTAB  GI:  Soft nondistended nontender BS+  : No suprapubic or CVA tenderness  MSK:  Moves extremities.  CNS:  Limited by lack of cooperation  INTEG:  warm dry skin  PSYCH:  somnolent    MONITOR:  CAPILLARY BLOOD GLUCOSE      POCT Blood Glucose.: 140 mg/dL (11 Dec 2020 21:30)        I&O's Summary    11 Dec 2020 07:01  -  12 Dec 2020 07:00  --------------------------------------------------------  IN: 1360 mL / OUT: 0 mL / NET: 1360 mL    12 Dec 2020 07:01  -  12 Dec 2020 20:37  --------------------------------------------------------  IN: 440 mL / OUT: 0 mL / NET: 440 mL                            15.6   9.22  )-----------( 245      ( 11 Dec 2020 08:47 )             47.0       12-12    x   |  x   |  x   ----------------------------<  x   x    |  x   |  0.62    Ca    8.7      11 Dec 2020 08:47    TPro  6.1<L>  /  Alb  2.9<L>  /  TBili  1.2  /  DBili  0.3  /  AST  67<H>  /  ALT  70<H>  /  AlkPhos  71  12-12            Culture:    TTE:    RADIOLOGY        MEDICATIONS  (STANDING):  ALBUTerol    90 MICROgram(s) HFA Inhaler 2 Puff(s) Inhalation every 6 hours  ascorbic acid 500 milliGRAM(s) Oral every 12 hours  cholecalciferol 2000 Unit(s) Oral daily  dextrose 40% Gel 15 Gram(s) Oral once  dextrose 5%. 1000 milliLiter(s) (100 mL/Hr) IV Continuous <Continuous>  dextrose 5%. 1000 milliLiter(s) (50 mL/Hr) IV Continuous <Continuous>  dextrose 50% Injectable 25 Gram(s) IV Push once  dextrose 50% Injectable 12.5 Gram(s) IV Push once  dextrose 50% Injectable 25 Gram(s) IV Push once  enoxaparin Injectable 40 milliGRAM(s) SubCutaneous daily  glucagon  Injectable 1 milliGRAM(s) IntraMuscular once  insulin lispro (ADMELOG) corrective regimen sliding scale   SubCutaneous three times a day before meals      MEDICATIONS  (PRN):  haloperidol    Injectable 2 milliGRAM(s) IntraMuscular every 6 hours PRN Agitation

## 2020-12-13 LAB
ALBUMIN SERPL ELPH-MCNC: 2.7 G/DL — LOW (ref 3.3–5.2)
ALBUMIN SERPL ELPH-MCNC: 2.8 G/DL — LOW (ref 3.3–5.2)
ALP SERPL-CCNC: 74 U/L — SIGNIFICANT CHANGE UP (ref 40–120)
ALP SERPL-CCNC: 74 U/L — SIGNIFICANT CHANGE UP (ref 40–120)
ALT FLD-CCNC: 105 U/L — HIGH
ALT FLD-CCNC: 106 U/L — HIGH
ANION GAP SERPL CALC-SCNC: 9 MMOL/L — SIGNIFICANT CHANGE UP (ref 5–17)
AST SERPL-CCNC: 64 U/L — HIGH
AST SERPL-CCNC: 64 U/L — HIGH
BASOPHILS # BLD AUTO: 0.03 K/UL — SIGNIFICANT CHANGE UP (ref 0–0.2)
BASOPHILS NFR BLD AUTO: 0.2 % — SIGNIFICANT CHANGE UP (ref 0–2)
BILIRUB DIRECT SERPL-MCNC: 0.3 MG/DL — SIGNIFICANT CHANGE UP (ref 0–0.3)
BILIRUB INDIRECT FLD-MCNC: SIGNIFICANT CHANGE UP MG/DL (ref 0.2–1)
BILIRUB SERPL-MCNC: 1 MG/DL — SIGNIFICANT CHANGE UP (ref 0.4–2)
BILIRUB SERPL-MCNC: 1 MG/DL — SIGNIFICANT CHANGE UP (ref 0.4–2)
BUN SERPL-MCNC: 45 MG/DL — HIGH (ref 8–20)
CALCIUM SERPL-MCNC: 9 MG/DL — SIGNIFICANT CHANGE UP (ref 8.6–10.2)
CHLORIDE SERPL-SCNC: 110 MMOL/L — HIGH (ref 98–107)
CO2 SERPL-SCNC: 26 MMOL/L — SIGNIFICANT CHANGE UP (ref 22–29)
CREAT SERPL-MCNC: 0.66 MG/DL — SIGNIFICANT CHANGE UP (ref 0.5–1.3)
EOSINOPHIL # BLD AUTO: 0.09 K/UL — SIGNIFICANT CHANGE UP (ref 0–0.5)
EOSINOPHIL NFR BLD AUTO: 0.7 % — SIGNIFICANT CHANGE UP (ref 0–6)
GLUCOSE BLDC GLUCOMTR-MCNC: 138 MG/DL — HIGH (ref 70–99)
GLUCOSE BLDC GLUCOMTR-MCNC: 161 MG/DL — HIGH (ref 70–99)
GLUCOSE SERPL-MCNC: 160 MG/DL — HIGH (ref 70–99)
HCT VFR BLD CALC: 50 % — SIGNIFICANT CHANGE UP (ref 39–50)
HGB BLD-MCNC: 16 G/DL — SIGNIFICANT CHANGE UP (ref 13–17)
IMM GRANULOCYTES NFR BLD AUTO: 0.7 % — SIGNIFICANT CHANGE UP (ref 0–1.5)
LYMPHOCYTES # BLD AUTO: 1.66 K/UL — SIGNIFICANT CHANGE UP (ref 1–3.3)
LYMPHOCYTES # BLD AUTO: 12.3 % — LOW (ref 13–44)
MCHC RBC-ENTMCNC: 28 PG — SIGNIFICANT CHANGE UP (ref 27–34)
MCHC RBC-ENTMCNC: 32 GM/DL — SIGNIFICANT CHANGE UP (ref 32–36)
MCV RBC AUTO: 87.4 FL — SIGNIFICANT CHANGE UP (ref 80–100)
MONOCYTES # BLD AUTO: 1.48 K/UL — HIGH (ref 0–0.9)
MONOCYTES NFR BLD AUTO: 10.9 % — SIGNIFICANT CHANGE UP (ref 2–14)
NEUTROPHILS # BLD AUTO: 10.18 K/UL — HIGH (ref 1.8–7.4)
NEUTROPHILS NFR BLD AUTO: 75.2 % — SIGNIFICANT CHANGE UP (ref 43–77)
PLATELET # BLD AUTO: 239 K/UL — SIGNIFICANT CHANGE UP (ref 150–400)
POTASSIUM SERPL-MCNC: 4.2 MMOL/L — SIGNIFICANT CHANGE UP (ref 3.5–5.3)
POTASSIUM SERPL-SCNC: 4.2 MMOL/L — SIGNIFICANT CHANGE UP (ref 3.5–5.3)
PROT SERPL-MCNC: 6.4 G/DL — LOW (ref 6.6–8.7)
PROT SERPL-MCNC: 6.4 G/DL — LOW (ref 6.6–8.7)
RBC # BLD: 5.72 M/UL — SIGNIFICANT CHANGE UP (ref 4.2–5.8)
RBC # FLD: 13.9 % — SIGNIFICANT CHANGE UP (ref 10.3–14.5)
SODIUM SERPL-SCNC: 145 MMOL/L — SIGNIFICANT CHANGE UP (ref 135–145)
WBC # BLD: 13.53 K/UL — HIGH (ref 3.8–10.5)
WBC # FLD AUTO: 13.53 K/UL — HIGH (ref 3.8–10.5)

## 2020-12-13 PROCEDURE — 99233 SBSQ HOSP IP/OBS HIGH 50: CPT

## 2020-12-13 RX ADMIN — Medication 500 MILLIGRAM(S): at 16:52

## 2020-12-13 RX ADMIN — HALOPERIDOL DECANOATE 2 MILLIGRAM(S): 100 INJECTION INTRAMUSCULAR at 09:03

## 2020-12-13 RX ADMIN — Medication 2: at 11:25

## 2020-12-13 RX ADMIN — Medication 2000 UNIT(S): at 16:52

## 2020-12-13 RX ADMIN — Medication 500 MILLIGRAM(S): at 06:04

## 2020-12-13 RX ADMIN — ENOXAPARIN SODIUM 40 MILLIGRAM(S): 100 INJECTION SUBCUTANEOUS at 16:52

## 2020-12-13 NOTE — PROGRESS NOTE ADULT - ASSESSMENT
89y/oM PMH prostate CA s/p resection not on home medications presenting with worsening SOB. Known COVID-19 infection. Seen in [facility] ER on 11/28.     #Acute hypoxic respiratory failure 2/2 COVID-19 PNA - still hypoxemic but stable on NC  - ID following  - C/w Decadron  - Pulmonary following  - Supplemental oxygen prn, wean as tolerated. Albuterol MDI  - Encourage proning, incentive spirometry, OOB, Chest PT  - PT following    #Metabolic encephalopathy in setting of infection, failure to thrive  - Pt extremely weak, fragile,  not participating with feeds  - Previously discussed w/ son Esteban regarding patients continued lethargy and SLP recs of NPO.  Continues to be recommended NPO by SLP  - Now with NGT and monitored for improvement of mental status. Continue TF  - Palliative consult re-exploring goals of care and options in case of no improvement.     #Transaminitis in setting of COVID-19 and Remdisivir   - Improved  - Monitor    #Thrombocytopenia   - Likely 2/2 infection, now resolved    DVT ppx sq Lovenox    Prognosis, guarded     Advanced Directives: Full code for now. Palliative consult  given continued failure to thrive re-exploring goals of care and options in case of no improvement..

## 2020-12-13 NOTE — PROGRESS NOTE ADULT - SUBJECTIVE AND OBJECTIVE BOX
HOSPITALIST PROGRESS NOTE    FELIPE AMAYA  [id number]  89yMale    Patient is a 89y old  Male who presents with a chief complaint of COVID PNA (12 Dec 2020 20:36)      SUBJECTIVE:   Chart reviewed since last visit.  Patient seen and examined at bedside for COVID-19, AHRF, Encephalopathy.  Denies any dyspnea, pain or fever.  Unsure where he is      OBJECTIVE:  Vital Signs Last 24 Hrs  T(C): 36.7 (13 Dec 2020 08:34), Max: 36.9 (13 Dec 2020 04:36)  T(F): 98.1 (13 Dec 2020 08:34), Max: 98.5 (13 Dec 2020 04:36)  HR: 91 (13 Dec 2020 08:34) (72 - 91)  BP: 148/85 (13 Dec 2020 08:34) (120/73 - 148/85)   RR: 18 (13 Dec 2020 08:34) (18 - 20)  SpO2: 100% (13 Dec 2020 08:34) (95% - 100%)    PHYSICAL EXAMINATION  General: Elderly male lying slouched in bed sleeping, NAD  HEENT:  opens eyes to verba stimulus  NECK:  supple  CVS: regular rate and rhythm S1 S2  RESP:  Poor effort - CTAB  GI:  Soft nondistended nontender BS+  : No suprapubic or CVA tenderness  MSK:  Moves extremities.  CNS:  Limited by lack of cooperation  INTEG:  warm dry skin  PSYCH:  somnolent    MONITOR:  CAPILLARY BLOOD GLUCOSE      POCT Blood Glucose.: 161 mg/dL (13 Dec 2020 11:20)        I&O's Summary    12 Dec 2020 07:01  -  13 Dec 2020 07:00  --------------------------------------------------------  IN: 960 mL / OUT: 0 mL / NET: 960 mL                            16.0   13.53 )-----------( 239      ( 13 Dec 2020 09:50 )             50.0       12-13    145  |  110<H>  |  45.0<H>  ----------------------------<  160<H>  4.2   |  26.0  |  0.66    Ca    9.0      13 Dec 2020 09:50    TPro  6.4<L>  /  Alb  2.7<L>  /  TBili  1.0  /  DBili  0.3  /  AST  64<H>  /  ALT  106<H>  /  AlkPhos  74  12-13            Culture:    TTE:    RADIOLOGY        MEDICATIONS  (STANDING):  ALBUTerol    90 MICROgram(s) HFA Inhaler 2 Puff(s) Inhalation every 6 hours  ascorbic acid 500 milliGRAM(s) Oral every 12 hours  cholecalciferol 2000 Unit(s) Oral daily  dextrose 40% Gel 15 Gram(s) Oral once  dextrose 5%. 1000 milliLiter(s) (50 mL/Hr) IV Continuous <Continuous>  dextrose 5%. 1000 milliLiter(s) (100 mL/Hr) IV Continuous <Continuous>  dextrose 50% Injectable 25 Gram(s) IV Push once  dextrose 50% Injectable 25 Gram(s) IV Push once  dextrose 50% Injectable 12.5 Gram(s) IV Push once  enoxaparin Injectable 40 milliGRAM(s) SubCutaneous daily  glucagon  Injectable 1 milliGRAM(s) IntraMuscular once  insulin lispro (ADMELOG) corrective regimen sliding scale   SubCutaneous three times a day before meals      MEDICATIONS  (PRN):  haloperidol    Injectable 2 milliGRAM(s) IntraMuscular every 6 hours PRN Agitation

## 2020-12-14 LAB
ALBUMIN SERPL ELPH-MCNC: 2.7 G/DL — LOW (ref 3.3–5.2)
ALP SERPL-CCNC: 70 U/L — SIGNIFICANT CHANGE UP (ref 40–120)
ALT FLD-CCNC: 98 U/L — HIGH
AST SERPL-CCNC: 52 U/L — HIGH
BILIRUB DIRECT SERPL-MCNC: 0.2 MG/DL — SIGNIFICANT CHANGE UP (ref 0–0.3)
BILIRUB INDIRECT FLD-MCNC: SIGNIFICANT CHANGE UP MG/DL (ref 0.2–1)
BILIRUB SERPL-MCNC: 0.7 MG/DL — SIGNIFICANT CHANGE UP (ref 0.4–2)
CREAT SERPL-MCNC: 0.63 MG/DL — SIGNIFICANT CHANGE UP (ref 0.5–1.3)
GLUCOSE BLDC GLUCOMTR-MCNC: 142 MG/DL — HIGH (ref 70–99)
GLUCOSE BLDC GLUCOMTR-MCNC: 151 MG/DL — HIGH (ref 70–99)
GLUCOSE BLDC GLUCOMTR-MCNC: 164 MG/DL — HIGH (ref 70–99)
PROT SERPL-MCNC: 6 G/DL — LOW (ref 6.6–8.7)
SARS-COV-2 RNA SPEC QL NAA+PROBE: DETECTED

## 2020-12-14 PROCEDURE — 99232 SBSQ HOSP IP/OBS MODERATE 35: CPT

## 2020-12-14 PROCEDURE — 71045 X-RAY EXAM CHEST 1 VIEW: CPT | Mod: 26

## 2020-12-14 PROCEDURE — 99497 ADVNCD CARE PLAN 30 MIN: CPT | Mod: 25

## 2020-12-14 RX ADMIN — Medication 500 MILLIGRAM(S): at 17:37

## 2020-12-14 RX ADMIN — Medication 500 MILLIGRAM(S): at 05:30

## 2020-12-14 RX ADMIN — Medication 2000 UNIT(S): at 11:21

## 2020-12-14 RX ADMIN — Medication 2: at 06:16

## 2020-12-14 RX ADMIN — ENOXAPARIN SODIUM 40 MILLIGRAM(S): 100 INJECTION SUBCUTANEOUS at 11:21

## 2020-12-14 RX ADMIN — Medication 2: at 11:21

## 2020-12-14 NOTE — PROGRESS NOTE ADULT - SUBJECTIVE AND OBJECTIVE BOX
HOSPITALIST PROGRESS NOTE    FELIPE AMAYA  [id number]  89yMale    Patient is a 89y old  Male who presents with a chief complaint of COVID PNA (14 Dec 2020 13:17)      SUBJECTIVE:   Chart reviewed since last visit.  Patient seen and examined at bedside for encephalopathy, dysphagia, COVID-19, hypoxia  Denies any complaints such as dyspnea, chest pain, palpitations.      OBJECTIVE:  Vital Signs Last 24 Hrs  T(C): 36.6 (14 Dec 2020 16:55), Max: 37 (14 Dec 2020 04:17)  T(F): 97.8 (14 Dec 2020 16:55), Max: 98.6 (14 Dec 2020 04:17)  HR: 102 (14 Dec 2020 16:55) (95 - 102)  BP: 145/86 (14 Dec 2020 16:55) (127/78 - 166/83)   RR: 18 (14 Dec 2020 16:55) (17 - 18)  SpO2: 95% (14 Dec 2020 16:55) (95% - 100%)    PHYSICAL EXAMINATION  General: Elderly male lying slouched in bed sleeping, NAD  HEENT:  opens eyes to verbal stimulus. 3LNC  NECK:  supple  CVS: regular rate and rhythm S1 S2  RESP:  Poor effort - CTAB  GI:  Soft nondistended nontender BS+  : No suprapubic or CVA tenderness  MSK:  Moves extremities.  CNS:  Limited by lack of cooperation  INTEG:  warm dry skin  PSYCH:  somnolent    MONITOR:  CAPILLARY BLOOD GLUCOSE      POCT Blood Glucose.: 142 mg/dL (14 Dec 2020 17:35)  POCT Blood Glucose.: 151 mg/dL (14 Dec 2020 11:18)  POCT Blood Glucose.: 164 mg/dL (14 Dec 2020 06:04)  POCT Blood Glucose.: 138 mg/dL (13 Dec 2020 23:58)        I&O's Summary    13 Dec 2020 07:01  -  14 Dec 2020 07:00  --------------------------------------------------------  IN: 480 mL / OUT: 0 mL / NET: 480 mL                            16.0   13.53 )-----------( 239      ( 13 Dec 2020 09:50 )             50.0       12-14    x   |  x   |  x   ----------------------------<  x   x    |  x   |  0.63    Ca    9.0      13 Dec 2020 09:50    TPro  6.0<L>  /  Alb  2.7<L>  /  TBili  0.7  /  DBili  0.2  /  AST  52<H>  /  ALT  98<H>  /  AlkPhos  70  12-14            Culture:    TTE:    RADIOLOGY        MEDICATIONS  (STANDING):  ALBUTerol    90 MICROgram(s) HFA Inhaler 2 Puff(s) Inhalation every 6 hours  ascorbic acid 500 milliGRAM(s) Oral every 12 hours  cholecalciferol 2000 Unit(s) Oral daily  dextrose 40% Gel 15 Gram(s) Oral once  dextrose 5%. 1000 milliLiter(s) (50 mL/Hr) IV Continuous <Continuous>  dextrose 5%. 1000 milliLiter(s) (100 mL/Hr) IV Continuous <Continuous>  dextrose 50% Injectable 25 Gram(s) IV Push once  dextrose 50% Injectable 12.5 Gram(s) IV Push once  dextrose 50% Injectable 25 Gram(s) IV Push once  enoxaparin Injectable 40 milliGRAM(s) SubCutaneous daily  glucagon  Injectable 1 milliGRAM(s) IntraMuscular once  insulin lispro (ADMELOG) corrective regimen sliding scale   SubCutaneous three times a day before meals      MEDICATIONS  (PRN):  haloperidol    Injectable 2 milliGRAM(s) IntraMuscular every 6 hours PRN Agitation

## 2020-12-14 NOTE — PROGRESS NOTE ADULT - PROBLEM SELECTOR PLAN 1
ID consulted and following - continue with recommendations for interventions  Pulm consulted and following continue with recs  Monitor fever and inflammatory marker trends  Now on Nasal Cannula to room air

## 2020-12-14 NOTE — PROGRESS NOTE ADULT - SUBJECTIVE AND OBJECTIVE BOX
Palliative Followup  Overnight events: Patient still dysphagic - awaiting f/u swallow eval                                     Admitted from:  home      Surrogate/HCP/Guardian: Phone#: Fidel High [phone]    Present Symptoms: Unable to obtain due to poor mentation  Dyspnea: 0   Nausea/Vomiting: No  Anxiety:  No  Depression: No  Fatigue: Yes   Loss of appetite: Yes     Pain: No pain reported             Character-            Duration-            Effect-            Factors-            Frequency-            Location-            Severity-    Review of Systems: Reviewed  Per HPI, all other ROS negative  Unable to obtain due to poor mentation     MEDICATIONS  (STANDING):  ALBUTerol    90 MICROgram(s) HFA Inhaler 2 Puff(s) Inhalation every 6 hours  ascorbic acid 500 milliGRAM(s) Oral every 12 hours  cholecalciferol 2000 Unit(s) Oral daily  dextrose 40% Gel 15 Gram(s) Oral once  dextrose 5%. 1000 milliLiter(s) (50 mL/Hr) IV Continuous <Continuous>  dextrose 5%. 1000 milliLiter(s) (100 mL/Hr) IV Continuous <Continuous>  dextrose 50% Injectable 25 Gram(s) IV Push once  dextrose 50% Injectable 12.5 Gram(s) IV Push once  dextrose 50% Injectable 25 Gram(s) IV Push once  enoxaparin Injectable 40 milliGRAM(s) SubCutaneous daily  glucagon  Injectable 1 milliGRAM(s) IntraMuscular once  insulin lispro (ADMELOG) corrective regimen sliding scale   SubCutaneous three times a day before meals    MEDICATIONS  (PRN):  haloperidol    Injectable 2 milliGRAM(s) IntraMuscular every 6 hours PRN Agitation      Vital Signs Last 24 Hrs  T(C): 36.6 (14 Dec 2020 08:00), Max: 37 (14 Dec 2020 04:17)  T(F): 97.8 (14 Dec 2020 08:00), Max: 98.6 (14 Dec 2020 04:17)  HR: 95 (14 Dec 2020 08:00) (95 - 99)  BP: 166/83 (14 Dec 2020 08:00) (127/78 - 166/83)  BP(mean): --  RR: 18 (14 Dec 2020 08:00) (17 - 18)  SpO2: 100% (14 Dec 2020 08:00) (100% - 100%)    Physical Exam:  Due to the nature of this patient's COVID-19 isolation status, no bedside physical exam was done to limit spread of infection. Examination highlights were provided by bedside nurse. Objective data were reviewed in detail    LABS:    Hepatic Function Panel (12.14.20 @ 07:26)    Indirect Reacting Bilirubin: Unable to Calculate mg/dL    Protein Total, Serum: 6.0 g/dL    Albumin, Serum: 2.7 g/dL    Bilirubin Total, Serum: 0.7 mg/dL    Bilirubin Direct, Serum: 0.2: Hemolyzed. Interpret with caution mg/dL    Alkaline Phosphatase, Serum: 70 U/L    Aspartate Aminotransferase (AST/SGOT): 52 U/L    Alanine Aminotransferase (ALT/SGPT): 98 U/L      RADIOLOGY & ADDITIONAL STUDIES:    CXR 12.10.20  IMPRESSION: Persistent scattered lung infiltrates. NG tube in place presently.    ADVANCE DIRECTIVES:   Full Code

## 2020-12-14 NOTE — PROGRESS NOTE ADULT - PROBLEM SELECTOR PLAN 4
Discussed case with Dr. Odom prior to calling patient's Son, Fidel  Call placed to Fidel to discuss goals of care. Fidel wants to continue current course and is focused on medically optimizing his father in hopes that he will return home to live with him.   Fidel states prior to admission patient was "a good 89 year old" - fully independent of ADLs with little to no assist needed to care for himself.   Fidel reported it was only 3 months ago that his father needed to move in with him.  Patient's code status is FULL CODE.   Much emotional support provided to Fidel on the telephone has he was speaking loudly and was very frustrated with his father's situation and inability to see/speak to him. Explained to Fidel the Nurses are trying their best to coordinate a facetime with him so he can see his father. Fidel expressed understanding and was reassured with this information - he was appreciative of his Nurse caring for his father today, Mirtha.   - Consoled Fidel, informed him the Palliative Care team is here for his support during this difficult time  - Fidel remains hopeful his father will be able to return home once he can regain the ability to swallow  - Will continue to support and monitor    Due to the patient’s health status and restrictions on visitation during the Public Health Emergency, the Advance Care Planning service was performed via __phone_________ (indicate modality) with patient’s _____son Fidel_____   >16 mins spent in above conversation    Total Time Spent__30__ minutes  This includes chart review, patient assessment, discussion and collaboration with interdisciplinary team members, ACP planning    Thank you for the opportunity to assist with the care of this patient.   [facility] [phone].

## 2020-12-14 NOTE — PROGRESS NOTE ADULT - ASSESSMENT
89y/oM PMH prostate CA s/p resection not on home medications presenting with worsening SOB. Known COVID-19 infection. Seen in [facility] ER on 11/28.     #Acute hypoxic respiratory failure 2/2 COVID-19 PNA - still hypoxemic but stable on NC  - ID following  - C/w Decadron  - Pulmonary following  - Supplemental oxygen prn, wean as tolerated. Albuterol MDI  - Encourage proning, incentive spirometry, OOB, Chest PT  - PT following    #Metabolic encephalopathy in setting of infection, failure to thrive  - Pt extremely weak, fragile,  not participating with feeds  - Previously discussed w/ son Esteban regarding patients continued lethargy and SLP recs of NPO.  Continues to be recommended NPO by SLP  - Now with NGT and monitored for improvement of mental status. Continue TF  - Palliative consult re-exploring goals of care and options in case of no improvement.     #Transaminitis in setting of COVID-19 and Remdisivir   - Improved  - Monitor    #Thrombocytopenia   - Likely 2/2 infection, now resolved    DVT ppx sq Lovenox    Prognosis, guarded     Advanced Directives: Full code for now. Palliative consult  given continued failure to thrive re-exploring goals of care and options in case of no improvement..      Disposition - Pending clinical improvement and PCR. Discussion with daughter 12/13; entertained possibility of home if persistent positive PCR

## 2020-12-14 NOTE — PROGRESS NOTE ADULT - ASSESSMENT
This is a 89 year old male PMHx prostate CA s/p resecetion admitted for SOB and [facility]INSON after having COVID19. Patient in ED was hypoxic and placed on HFNC, confused. No reports in ED of chest pain, fever, chills, n/v/d. ID consulted for COVID interventions, pulmonary consulted as well. GOC has been held by primary team with Fidel (son) - Code status full code. Called for palliative care consult for further GOC due to continued failure to thrive, NPO with NGT in place due to failing Swallow evals.

## 2020-12-15 LAB
ALBUMIN SERPL ELPH-MCNC: 2.7 G/DL — LOW (ref 3.3–5.2)
ALP SERPL-CCNC: 72 U/L — SIGNIFICANT CHANGE UP (ref 40–120)
ALT FLD-CCNC: 79 U/L — HIGH
ANION GAP SERPL CALC-SCNC: 10 MMOL/L — SIGNIFICANT CHANGE UP (ref 5–17)
AST SERPL-CCNC: 39 U/L — SIGNIFICANT CHANGE UP
BILIRUB DIRECT SERPL-MCNC: 0.4 MG/DL — HIGH (ref 0–0.3)
BILIRUB INDIRECT FLD-MCNC: 0.7 MG/DL — SIGNIFICANT CHANGE UP (ref 0.2–1)
BILIRUB SERPL-MCNC: 1.1 MG/DL — SIGNIFICANT CHANGE UP (ref 0.4–2)
BUN SERPL-MCNC: 45 MG/DL — HIGH (ref 8–20)
CALCIUM SERPL-MCNC: 8.9 MG/DL — SIGNIFICANT CHANGE UP (ref 8.6–10.2)
CHLORIDE SERPL-SCNC: 108 MMOL/L — HIGH (ref 98–107)
CO2 SERPL-SCNC: 28 MMOL/L — SIGNIFICANT CHANGE UP (ref 22–29)
CREAT SERPL-MCNC: 0.81 MG/DL — SIGNIFICANT CHANGE UP (ref 0.5–1.3)
GLUCOSE BLDC GLUCOMTR-MCNC: 116 MG/DL — HIGH (ref 70–99)
GLUCOSE BLDC GLUCOMTR-MCNC: 160 MG/DL — HIGH (ref 70–99)
GLUCOSE BLDC GLUCOMTR-MCNC: 163 MG/DL — HIGH (ref 70–99)
GLUCOSE SERPL-MCNC: 138 MG/DL — HIGH (ref 70–99)
HCT VFR BLD CALC: 47.9 % — SIGNIFICANT CHANGE UP (ref 39–50)
HGB BLD-MCNC: 15.4 G/DL — SIGNIFICANT CHANGE UP (ref 13–17)
MCHC RBC-ENTMCNC: 28.4 PG — SIGNIFICANT CHANGE UP (ref 27–34)
MCHC RBC-ENTMCNC: 32.2 GM/DL — SIGNIFICANT CHANGE UP (ref 32–36)
MCV RBC AUTO: 88.4 FL — SIGNIFICANT CHANGE UP (ref 80–100)
PLATELET # BLD AUTO: 233 K/UL — SIGNIFICANT CHANGE UP (ref 150–400)
POTASSIUM SERPL-MCNC: 5.1 MMOL/L — SIGNIFICANT CHANGE UP (ref 3.5–5.3)
POTASSIUM SERPL-SCNC: 5.1 MMOL/L — SIGNIFICANT CHANGE UP (ref 3.5–5.3)
PROT SERPL-MCNC: 6.3 G/DL — LOW (ref 6.6–8.7)
RBC # BLD: 5.42 M/UL — SIGNIFICANT CHANGE UP (ref 4.2–5.8)
RBC # FLD: 14.6 % — HIGH (ref 10.3–14.5)
SODIUM SERPL-SCNC: 146 MMOL/L — HIGH (ref 135–145)
WBC # BLD: 15.85 K/UL — HIGH (ref 3.8–10.5)
WBC # FLD AUTO: 15.85 K/UL — HIGH (ref 3.8–10.5)

## 2020-12-15 PROCEDURE — 99232 SBSQ HOSP IP/OBS MODERATE 35: CPT

## 2020-12-15 PROCEDURE — 93010 ELECTROCARDIOGRAM REPORT: CPT

## 2020-12-15 RX ORDER — ACETAMINOPHEN 500 MG
1000 TABLET ORAL ONCE
Refills: 0 | Status: COMPLETED | OUTPATIENT
Start: 2020-12-15 | End: 2020-12-15

## 2020-12-15 RX ORDER — SODIUM CHLORIDE 9 MG/ML
1000 INJECTION, SOLUTION INTRAVENOUS
Refills: 0 | Status: DISCONTINUED | OUTPATIENT
Start: 2020-12-15 | End: 2020-12-17

## 2020-12-15 RX ADMIN — ENOXAPARIN SODIUM 40 MILLIGRAM(S): 100 INJECTION SUBCUTANEOUS at 11:24

## 2020-12-15 RX ADMIN — SODIUM CHLORIDE 75 MILLILITER(S): 9 INJECTION, SOLUTION INTRAVENOUS at 09:59

## 2020-12-15 RX ADMIN — Medication 2: at 11:29

## 2020-12-15 RX ADMIN — Medication 400 MILLIGRAM(S): at 14:49

## 2020-12-15 RX ADMIN — SODIUM CHLORIDE 75 MILLILITER(S): 9 INJECTION, SOLUTION INTRAVENOUS at 22:06

## 2020-12-15 RX ADMIN — Medication 500 MILLIGRAM(S): at 05:00

## 2020-12-15 RX ADMIN — Medication 2: at 17:45

## 2020-12-15 RX ADMIN — Medication 1000 MILLIGRAM(S): at 17:24

## 2020-12-15 NOTE — PROVIDER CONTACT NOTE (OTHER) - ASSESSMENT
Patient is sleeping in bed. No distress noted. VSS. Arouses to voice, does not answer appropriately, does not follow simple commands.

## 2020-12-15 NOTE — PROGRESS NOTE ADULT - ASSESSMENT
89y/oM PMH prostate CA s/p resection not on home medications presenting with worsening SOB. Known COVID-19 infection. Seen in [facility] ER on 11/28.     #Acute hypoxic respiratory failure 2/2 COVID-19 PNA - no longer hypoxic. PCR(+)  - ID following  - Pulmonary following  - Supplemental oxygen prn, wean as tolerated. Albuterol MDI  - Encourage proning, incentive spirometry, OOB, Chest PT  - PT following    #Metabolic encephalopathy in setting of infection, failure to thrive  - Pt extremely weak, fragile,  not participating with feeds  - Previously discussed w/ son Esteban regarding patients continued lethargy and SLP recs of NPO.  Continues to be recommended NPO by SLP  - Now with NGT and monitored for improvement of mental status. Continue TF  - Palliative consult re-exploring goals of care and options in case of no improvement.        #Transaminitis in setting of COVID-19 and Remdesivir   - Improved  - Monitor    #Thrombocytopenia   - Likely 2/2 infection, now resolved    DVT ppx sq Lovenox    Prognosis, guarded     Advanced Directives: Full code for now. Palliative consult  given continued failure to thrive re-exploring goals of care and options in case of no improvement..      Disposition - Pending clinical improvement and PCR. Discussion with daughter 12/13; entertained possibility of home if persistent positive PCR   discussed with daughter 12/15 - updated on SLP recommendations for NPO.   options of G-tube vs comfort care discussed. Family wants PEG as feels that he could go home then and would do better.  Very frustrated about not being able to visit or see

## 2020-12-15 NOTE — PROGRESS NOTE ADULT - SUBJECTIVE AND OBJECTIVE BOX
HOSPITALIST PROGRESS NOTE    FELIPE AMAYA  [id number]  89yMale    Patient is a 89y old  Male who presents with a chief complaint of COVID PNA (14 Dec 2020 17:56)      SUBJECTIVE:   Chart reviewed since last visit.  Patient seen and examined at bedside for encephalopathy dysphagia, COVID-19.  Confused - complaining of pain all over.        OBJECTIVE:  Vital Signs Last 24 Hrs  T(C): 36.7 (15 Dec 2020 15:30), Max: 36.7 (15 Dec 2020 05:09)  T(F): 98.1 (15 Dec 2020 15:30), Max: 98.1 (15 Dec 2020 15:30)  HR: 92 (15 Dec 2020 15:30) (92 - 108)  BP: 102/65 (15 Dec 2020 15:30) (102/65 - 113/71)   RR: 18 (15 Dec 2020 15:30) (18 - 18)  SpO2: 97% (15 Dec 2020 15:30) (92% - 97%)    PHYSICAL EXAMINATION  General: Elderly male lying in bed sleeping, NAD  HEENT:  opens eyes to verbal stimulus. 3LNC. NGT L nostril  NECK:  supple  CVS: regular rate and rhythm S1 S2  RESP:  Poor effort - CTAB  GI:  Soft nondistended nontender BS+  : No suprapubic or CVA tenderness  MSK:  Moves extremities.  CNS:  Limited by lack of cooperation  INTEG:  warm dry skin  PSYCH:  somnolent       MONITOR:  CAPILLARY BLOOD GLUCOSE      POCT Blood Glucose.: 163 mg/dL (15 Dec 2020 17:34)  POCT Blood Glucose.: 160 mg/dL (15 Dec 2020 11:25)  POCT Blood Glucose.: 116 mg/dL (15 Dec 2020 04:16)        I&O's Summary    14 Dec 2020 07:01  -  15 Dec 2020 07:00  --------------------------------------------------------  IN: 0 mL / OUT: 1000 mL / NET: -1000 mL                            15.4   15.85 )-----------( 233      ( 15 Dec 2020 07:13 )             47.9       12-15    146<H>  |  108<H>  |  45.0<H>  ----------------------------<  138<H>  5.1   |  28.0  |  0.81    Ca    8.9      15 Dec 2020 07:13    TPro  6.3<L>  /  Alb  2.7<L>  /  TBili  1.1  /  DBili  0.4<H>  /  AST  39  /  ALT  79<H>  /  AlkPhos  72  12-15            Culture:    TTE:    RADIOLOGY        MEDICATIONS  (STANDING):  ALBUTerol    90 MICROgram(s) HFA Inhaler 2 Puff(s) Inhalation every 6 hours  ascorbic acid 500 milliGRAM(s) Oral every 12 hours  cholecalciferol 2000 Unit(s) Oral daily  dextrose 40% Gel 15 Gram(s) Oral once  dextrose 5%. 1000 milliLiter(s) (50 mL/Hr) IV Continuous <Continuous>  dextrose 5%. 1000 milliLiter(s) (100 mL/Hr) IV Continuous <Continuous>  dextrose 50% Injectable 25 Gram(s) IV Push once  dextrose 50% Injectable 12.5 Gram(s) IV Push once  dextrose 50% Injectable 25 Gram(s) IV Push once  enoxaparin Injectable 40 milliGRAM(s) SubCutaneous daily  glucagon  Injectable 1 milliGRAM(s) IntraMuscular once  insulin lispro (ADMELOG) corrective regimen sliding scale   SubCutaneous three times a day before meals  sodium chloride 0.45%. 1000 milliLiter(s) (75 mL/Hr) IV Continuous <Continuous>      MEDICATIONS  (PRN):  haloperidol    Injectable 2 milliGRAM(s) IntraMuscular every 6 hours PRN Agitation

## 2020-12-16 LAB
ALBUMIN SERPL ELPH-MCNC: 2.5 G/DL — LOW (ref 3.3–5.2)
ALP SERPL-CCNC: 70 U/L — SIGNIFICANT CHANGE UP (ref 40–120)
ALT FLD-CCNC: 55 U/L — HIGH
ANION GAP SERPL CALC-SCNC: 7 MMOL/L — SIGNIFICANT CHANGE UP (ref 5–17)
AST SERPL-CCNC: 25 U/L — SIGNIFICANT CHANGE UP
BILIRUB DIRECT SERPL-MCNC: 0.3 MG/DL — SIGNIFICANT CHANGE UP (ref 0–0.3)
BILIRUB INDIRECT FLD-MCNC: 0.4 MG/DL — SIGNIFICANT CHANGE UP (ref 0.2–1)
BILIRUB SERPL-MCNC: 0.7 MG/DL — SIGNIFICANT CHANGE UP (ref 0.4–2)
BUN SERPL-MCNC: 43 MG/DL — HIGH (ref 8–20)
CALCIUM SERPL-MCNC: 8.8 MG/DL — SIGNIFICANT CHANGE UP (ref 8.6–10.2)
CHLORIDE SERPL-SCNC: 110 MMOL/L — HIGH (ref 98–107)
CO2 SERPL-SCNC: 28 MMOL/L — SIGNIFICANT CHANGE UP (ref 22–29)
CREAT SERPL-MCNC: 0.85 MG/DL — SIGNIFICANT CHANGE UP (ref 0.5–1.3)
GLUCOSE BLDC GLUCOMTR-MCNC: 113 MG/DL — HIGH (ref 70–99)
GLUCOSE BLDC GLUCOMTR-MCNC: 119 MG/DL — HIGH (ref 70–99)
GLUCOSE BLDC GLUCOMTR-MCNC: 129 MG/DL — HIGH (ref 70–99)
GLUCOSE BLDC GLUCOMTR-MCNC: 131 MG/DL — HIGH (ref 70–99)
GLUCOSE BLDC GLUCOMTR-MCNC: 177 MG/DL — HIGH (ref 70–99)
GLUCOSE SERPL-MCNC: 141 MG/DL — HIGH (ref 70–99)
HCT VFR BLD CALC: 44.9 % — SIGNIFICANT CHANGE UP (ref 39–50)
HGB BLD-MCNC: 14.3 G/DL — SIGNIFICANT CHANGE UP (ref 13–17)
MCHC RBC-ENTMCNC: 28.4 PG — SIGNIFICANT CHANGE UP (ref 27–34)
MCHC RBC-ENTMCNC: 31.8 GM/DL — LOW (ref 32–36)
MCV RBC AUTO: 89.3 FL — SIGNIFICANT CHANGE UP (ref 80–100)
PLATELET # BLD AUTO: 200 K/UL — SIGNIFICANT CHANGE UP (ref 150–400)
POTASSIUM SERPL-MCNC: 4.5 MMOL/L — SIGNIFICANT CHANGE UP (ref 3.5–5.3)
POTASSIUM SERPL-SCNC: 4.5 MMOL/L — SIGNIFICANT CHANGE UP (ref 3.5–5.3)
PROT SERPL-MCNC: 6.5 G/DL — LOW (ref 6.6–8.7)
RBC # BLD: 5.03 M/UL — SIGNIFICANT CHANGE UP (ref 4.2–5.8)
RBC # FLD: 14.9 % — HIGH (ref 10.3–14.5)
SODIUM SERPL-SCNC: 145 MMOL/L — SIGNIFICANT CHANGE UP (ref 135–145)
WBC # BLD: 12.27 K/UL — HIGH (ref 3.8–10.5)
WBC # FLD AUTO: 12.27 K/UL — HIGH (ref 3.8–10.5)

## 2020-12-16 PROCEDURE — 93010 ELECTROCARDIOGRAM REPORT: CPT

## 2020-12-16 PROCEDURE — 99232 SBSQ HOSP IP/OBS MODERATE 35: CPT

## 2020-12-16 PROCEDURE — 99222 1ST HOSP IP/OBS MODERATE 55: CPT

## 2020-12-16 RX ADMIN — ENOXAPARIN SODIUM 40 MILLIGRAM(S): 100 INJECTION SUBCUTANEOUS at 11:39

## 2020-12-16 RX ADMIN — Medication 2000 UNIT(S): at 11:39

## 2020-12-16 RX ADMIN — Medication 500 MILLIGRAM(S): at 16:13

## 2020-12-16 RX ADMIN — Medication 2: at 16:20

## 2020-12-16 RX ADMIN — ALBUTEROL 2 PUFF(S): 90 AEROSOL, METERED ORAL at 21:15

## 2020-12-16 RX ADMIN — Medication 500 MILLIGRAM(S): at 06:07

## 2020-12-16 NOTE — PROGRESS NOTE ADULT - SUBJECTIVE AND OBJECTIVE BOX
HOSPITALIST PROGRESS NOTE    FELIPE AMAYA  [id number]  89yMale    Patient is a 89y old  Male who presents with a chief complaint of COVID PNA (14 Dec 2020 17:56)      SUBJECTIVE:   Chart reviewed since last visit.  Patient seen and examined at bedside for encephalopathy dysphagia, COVID-19.  More awake this morning, responds to questions, pleasantly confused however follows commands (close eyes, wiggle toes)  Unable to obtain ROS    OBJECTIVE:  Vital Signs Last 24 Hrs  T(C): 37 (16 Dec 2020 07:50), Max: 37 (16 Dec 2020 07:50)  T(F): 98.6 (16 Dec 2020 07:50), Max: 98.6 (16 Dec 2020 07:50)  HR: 97 (16 Dec 2020 07:50) (85 - 97)  BP: 152/80 (16 Dec 2020 07:50) (102/65 - 152/80)  BP(mean): --  RR: 18 (16 Dec 2020 07:50) (17 - 18)  SpO2: 96% (16 Dec 2020 07:50) (96% - 100%) on RA    PHYSICAL EXAMINATION  General: Elderly male lying in bed sleeping, NAD  HEENT: Opens eyes to verbal stimulus. NGT L nostril  NECK:  supple  CVS: regular rate and rhythm S1 S2  RESP:  Poor effort - CTAB  GI:  Soft nondistended nontender BS+  : No suprapubic or CVA tenderness  MSK:  Moves extremities.  CNS: A&Ox3, follows commands today and more awake, eyes open   INTEG:  warm dry skin  PSYCH:  somnolent     LABS:                           14.3   12.27 )-----------( 200      ( 16 Dec 2020 06:50 )             44.9   12-16    145  |  110<H>  |  43.0<H>  ----------------------------<  141<H>  4.5   |  28.0  |  0.85    Ca    8.8      16 Dec 2020 06:50    TPro  6.5<L>  /  Alb  2.5<L>  /  TBili  0.7  /  DBili  0.3  /  AST  25  /  ALT  55<H>  /  AlkPhos  70  12-16      MEDICATIONS  (STANDING):  ALBUTerol    90 MICROgram(s) HFA Inhaler 2 Puff(s) Inhalation every 6 hours  ascorbic acid 500 milliGRAM(s) Oral every 12 hours  cholecalciferol 2000 Unit(s) Oral daily  dextrose 40% Gel 15 Gram(s) Oral once  dextrose 5%. 1000 milliLiter(s) (50 mL/Hr) IV Continuous <Continuous>  dextrose 5%. 1000 milliLiter(s) (100 mL/Hr) IV Continuous <Continuous>  dextrose 50% Injectable 25 Gram(s) IV Push once  dextrose 50% Injectable 12.5 Gram(s) IV Push once  dextrose 50% Injectable 25 Gram(s) IV Push once  enoxaparin Injectable 40 milliGRAM(s) SubCutaneous daily  glucagon  Injectable 1 milliGRAM(s) IntraMuscular once  insulin lispro (ADMELOG) corrective regimen sliding scale   SubCutaneous three times a day before meals  sodium chloride 0.45%. 1000 milliLiter(s) (75 mL/Hr) IV Continuous <Continuous>    MEDICATIONS  (PRN):  haloperidol    Injectable 2 milliGRAM(s) IntraMuscular every 6 hours PRN Agitation       HOSPITALIST PROGRESS NOTE    FELIPE AMAYA  [id number]  89yMale    Patient is a 89y old  Male who presents with a chief complaint of COVID PNA (14 Dec 2020 17:56)      SUBJECTIVE:   Chart reviewed since last visit.  Patient seen and examined at bedside for encephalopathy dysphagia, COVID-19.  More awake this morning, responds to questions, pleasantly confused however follows commands (close eyes, wiggle toes)  Unable to obtain ROS    OBJECTIVE:  Vital Signs Last 24 Hrs  T(C): 37 (16 Dec 2020 07:50), Max: 37 (16 Dec 2020 07:50)  T(F): 98.6 (16 Dec 2020 07:50), Max: 98.6 (16 Dec 2020 07:50)  HR: 97 (16 Dec 2020 07:50) (85 - 97)  BP: 152/80 (16 Dec 2020 07:50) (102/65 - 152/80)  BP(mean): --  RR: 18 (16 Dec 2020 07:50) (17 - 18)  SpO2: 96% (16 Dec 2020 07:50) (96% - 100%) on RA    PHYSICAL EXAMINATION  General: Elderly male, lying in bed, awake, eyes open today, NAD  HEENT: Eyes open, NGT L nostril  NECK:  supple  CVS: regular rate and rhythm S1 S2  RESP:  Poor effort - CTAB  GI:  Soft nondistended nontender BS+  : No suprapubic or CVA tenderness  MSK:  Moves extremities.  CNS: A&Ox3, follows commands today and more awake, eyes open   INTEG: Warm dry skin  PSYCH:  Somnolent     LABS:                           14.3   12.27 )-----------( 200      ( 16 Dec 2020 06:50 )             44.9   12-16    145  |  110<H>  |  43.0<H>  ----------------------------<  141<H>  4.5   |  28.0  |  0.85    Ca    8.8      16 Dec 2020 06:50    TPro  6.5<L>  /  Alb  2.5<L>  /  TBili  0.7  /  DBili  0.3  /  AST  25  /  ALT  55<H>  /  AlkPhos  70  12-16      MEDICATIONS  (STANDING):  ALBUTerol    90 MICROgram(s) HFA Inhaler 2 Puff(s) Inhalation every 6 hours  ascorbic acid 500 milliGRAM(s) Oral every 12 hours  cholecalciferol 2000 Unit(s) Oral daily  dextrose 40% Gel 15 Gram(s) Oral once  dextrose 5%. 1000 milliLiter(s) (50 mL/Hr) IV Continuous <Continuous>  dextrose 5%. 1000 milliLiter(s) (100 mL/Hr) IV Continuous <Continuous>  dextrose 50% Injectable 25 Gram(s) IV Push once  dextrose 50% Injectable 12.5 Gram(s) IV Push once  dextrose 50% Injectable 25 Gram(s) IV Push once  enoxaparin Injectable 40 milliGRAM(s) SubCutaneous daily  glucagon  Injectable 1 milliGRAM(s) IntraMuscular once  insulin lispro (ADMELOG) corrective regimen sliding scale   SubCutaneous three times a day before meals  sodium chloride 0.45%. 1000 milliLiter(s) (75 mL/Hr) IV Continuous <Continuous>    MEDICATIONS  (PRN):  haloperidol    Injectable 2 milliGRAM(s) IntraMuscular every 6 hours PRN Agitation       HOSPITALIST PROGRESS NOTE    FELIPE AMAYA  [id number]  89yMale    Patient is a 89y old  Male who presents with a chief complaint of COVID PNA (14 Dec 2020 17:56)      SUBJECTIVE:   Chart reviewed since last visit.  Patient seen and examined at bedside for encephalopathy dysphagia, COVID-19.  More awake this morning, responds to questions, pleasantly confused however follows commands (close eyes, wiggle toes)  Unable to obtain ROS    OBJECTIVE:  Vital Signs Last 24 Hrs  T(C): 37 (16 Dec 2020 07:50), Max: 37 (16 Dec 2020 07:50)  T(F): 98.6 (16 Dec 2020 07:50), Max: 98.6 (16 Dec 2020 07:50)  HR: 97 (16 Dec 2020 07:50) (85 - 97)  BP: 152/80 (16 Dec 2020 07:50) (102/65 - 152/80)   RR: 18 (16 Dec 2020 07:50) (17 - 18)  SpO2: 96% (16 Dec 2020 07:50) (96% - 100%) on RA    PHYSICAL EXAMINATION  General: Elderly male, lying in bed, awake, eyes open today, NAD  HEENT: Eyes open, NGT L nostril  NECK:  supple  CVS: regular rate and rhythm S1 S2  RESP:  Poor effort - CTAB  GI:  Soft nondistended nontender BS+  : No suprapubic or CVA tenderness  MSK:  Moves extremities.  CNS: A&Ox3, follows commands today and more awake, eyes open   INTEG: Warm dry skin  PSYCH:  Somnolent     LABS:                           14.3   12.27 )-----------( 200      ( 16 Dec 2020 06:50 )             44.9   12-16    145  |  110<H>  |  43.0<H>  ----------------------------<  141<H>  4.5   |  28.0  |  0.85    Ca    8.8      16 Dec 2020 06:50    TPro  6.5<L>  /  Alb  2.5<L>  /  TBili  0.7  /  DBili  0.3  /  AST  25  /  ALT  55<H>  /  AlkPhos  70  12-16      MEDICATIONS  (STANDING):  ALBUTerol    90 MICROgram(s) HFA Inhaler 2 Puff(s) Inhalation every 6 hours  ascorbic acid 500 milliGRAM(s) Oral every 12 hours  cholecalciferol 2000 Unit(s) Oral daily  dextrose 40% Gel 15 Gram(s) Oral once  dextrose 5%. 1000 milliLiter(s) (50 mL/Hr) IV Continuous <Continuous>  dextrose 5%. 1000 milliLiter(s) (100 mL/Hr) IV Continuous <Continuous>  dextrose 50% Injectable 25 Gram(s) IV Push once  dextrose 50% Injectable 12.5 Gram(s) IV Push once  dextrose 50% Injectable 25 Gram(s) IV Push once  enoxaparin Injectable 40 milliGRAM(s) SubCutaneous daily  glucagon  Injectable 1 milliGRAM(s) IntraMuscular once  insulin lispro (ADMELOG) corrective regimen sliding scale   SubCutaneous three times a day before meals  sodium chloride 0.45%. 1000 milliLiter(s) (75 mL/Hr) IV Continuous <Continuous>    MEDICATIONS  (PRN):  haloperidol    Injectable 2 milliGRAM(s) IntraMuscular every 6 hours PRN Agitation

## 2020-12-16 NOTE — CHART NOTE - NSCHARTNOTEFT_GEN_A_CORE
Source: Patient [ ]  Family [ ]   other [x ]    Current Diet: Diet, NPO with Tube Feed:   Tube Feeding Modality: Nasogastric  Jevity 1.5 Manpreet (JEVITY1.5)  Total Volume for 24 Hours (mL): 960  Continuous  Starting Tube Feed Rate {mL per Hour}: 10  Increase Tube Feed Rate by (mL): 10     Every 4 hours  Until Goal Tube Feed Rate (mL per Hour): 40  Tube Feed Duration (in Hours): 24  Tube Feed Start Time: 16:00 (12-10-20 @ 16:20)    Enteral Nutrition: Jevity @ 40ml/hr x 20 hrs daily provides 800ml, 1200 kcal, 51g protein daily    Current Weight:   (12/2) 175 lbs    % Weight Change no new weight to assess, will continue to monitor.  No edema noted.     Pertinent Medications: MEDICATIONS  (STANDING):  ALBUTerol    90 MICROgram(s) HFA Inhaler 2 Puff(s) Inhalation every 6 hours  ascorbic acid 500 milliGRAM(s) Oral every 12 hours  cholecalciferol 2000 Unit(s) Oral daily  dextrose 40% Gel 15 Gram(s) Oral once  dextrose 5%. 1000 milliLiter(s) (50 mL/Hr) IV Continuous <Continuous>  dextrose 5%. 1000 milliLiter(s) (100 mL/Hr) IV Continuous <Continuous>  dextrose 50% Injectable 25 Gram(s) IV Push once  dextrose 50% Injectable 12.5 Gram(s) IV Push once  dextrose 50% Injectable 25 Gram(s) IV Push once  enoxaparin Injectable 40 milliGRAM(s) SubCutaneous daily  glucagon  Injectable 1 milliGRAM(s) IntraMuscular once  insulin lispro (ADMELOG) corrective regimen sliding scale   SubCutaneous three times a day before meals  sodium chloride 0.45%. 1000 milliLiter(s) (75 mL/Hr) IV Continuous <Continuous>    MEDICATIONS  (PRN):  haloperidol    Injectable 2 milliGRAM(s) IntraMuscular every 6 hours PRN Agitation    Pertinent Labs: CBC Full  -  ( 16 Dec 2020 06:50 )  WBC Count : 12.27 K/uL  RBC Count : 5.03 M/uL  Hemoglobin : 14.3 g/dL  Hematocrit : 44.9 %  Platelet Count - Automated : 200 K/uL  Mean Cell Volume : 89.3 fl  Mean Cell Hemoglobin : 28.4 pg  Mean Cell Hemoglobin Concentration : 31.8 gm/dL  12-16 Nan/a   Glu n/a   K+ n/a   Cr  n/a   BUN n/a   Phos n/a   Alb 2.5 g/dL<L> PAB n/a       Skin: no skin breakdown noted     Current Nutrition Diagnosis: Pt remains at nutrition risk secondary to increased nutrient needs related to increased physiologic demand for nutrient as evidenced by pt with acute respiratory failure with hypoxia 2/2 +COVID19 PNA.  Pt now unable to tolerate po due to failed swallow evaluations.  Pt currently receiving Jevity @ 40ml/hr.  Aware Palliative care following for GOC.    Recommendations:   1. Consider increasing Jevity 1.5 by 10ml q6 hrs to goal rate of 60ml/hr x 20 hrs daily to better meet pts estimated nutrition needs (1200ml, 1800 kcal, 77g protein)   2. Continue with Vit C, Vit D daily  3. Monitor daily wts     Monitoring and Evaluation:   [ ] PO intake [x ] Tolerance to diet prescription [X] Weights  [X] Follow up per protocol [X] Labs:

## 2020-12-16 NOTE — SWALLOW BEDSIDE ASSESSMENT ADULT - SLP GENERAL OBSERVATIONS
Pt received asleep in bed, briefly opening eyes to voice and returning to sleep, +50% 02 via HFNC, Sp02 88-91%, RN Suyapa at bedside., nonverbal pain 0/10 pre/post eval
Pt received & seen seated upright in bed, awake/alert, confused, reduced cognition, +follows commands, baseline hoarse vocal quality, NGT, 0/10 pain

## 2020-12-16 NOTE — CONSULT NOTE ADULT - ASSESSMENT
89 year old male hx of prostate cancer s/p resection, on no home meds presents with SOB and STINSON getting worsening of symptoms after getting COVID-19. Pt in ED hypoxic on room air now on High flow oxygen.     COVID 19 + infection  Viral pneumonia  Acute hypoxic respiratory failure  transaminitis  Leukopenia  Thrombocytopenia      - currently on hi jaden O2  - remdesivir ordered  - decadron 6 mg daily while on remdesivir  - PCT low, inflammatory markers elevated  - Avoid antibiotics unless there is a concern for a bacterial infection  - VTE prophylaxis per current [facility] COVID 19 protocol  - Check CBC with diff, CMP with LFT's daily,  ESR, CRP, Ferritin, LDH,  procalcitonin,  D dimer,  type and screen x 1  - Encourage self proning q2h and ambulation as tolerated  - Taper off O2 as tolerated- once tolerating room air would ideally monitor for 24h prior to discharge.  - Inpatient Contact/Airborne isolation         Discussed with Dr Ortega  Will follow
-COVID 19 pna  -Hypoxic resp failure; see above  -Delerium    RECC:  Decadron. ABG. D-dimer ordered; BID lovenox for now. ID eval for remdesivir. Check CRP. Follow lytes.     Prognosis very guarded. ICU eval if any worsening as family requesting all aggressive care.     D/W Dr Ortega.
89M h/o prostate cancer s/p resection had sick contact with COVID at home presented to the ER for fever/malaise, tested positive on 11/28 then discharged home, returned on 12/2/20 admitted for dyspnea, hypoxic respiratory failure required HFNC, received treatment with dexamethasone/redemsivir/doxycycline for COVID-pneumonia, course complicated by delirium, dysphagia on NG-tube feed, remains COVID-19 PCR positive as of 12/14/20 but off supplemental oxygen, noted on telemetry monitor since 4pm with PVCs, trigeminies, cardiology consulted for evaluation.    Asymptomatic PVCs/trigeminies, new onset just for the past 3 hours, BP stable, will continue to observe on telemetry off pharmacological suppression for now. Borderline sinus tachycardia reacting to underlying COVID pneumonia/debility.   -check pBNP and TSH, recheck Troponin x1 in AM lab  -defer TTE for now unless abnormal cardiac biomarkers to avoid staffs exposure        Den Locke DO, [facility]  Faculty Non-Invasive Cardiologist  [phone]
This is a 89 year old male PMHx prostate CA s/p resecetion admitted for SOB and STINSON after having COVID19. Patient in ED was hypoxic and placed on HFNC, confused. No reports in ED of chest pain, fever, chills, n/v/d. ID consulted for COVID interventions, pulmonary consulted as well. GOC has been held by primary team with Fidel (son) - Code status full code. Called for palliative care consult for further GOC due to continued failure to thrive, NPO with NGT in place due to failing Swallow evals.

## 2020-12-16 NOTE — SWALLOW BEDSIDE ASSESSMENT ADULT - SWALLOW EVAL: DIAGNOSIS
Oral dysphagia for administered trials, impacted by cognition. Pharyngeal dysphagia suspected with intake of thin fluids: +overt s/s aspiration post intake. Pharyngeal stage of swallow clinically unremarkable for puree & nectar thick fluids with no overt s/s aspiration observed.

## 2020-12-16 NOTE — SWALLOW BEDSIDE ASSESSMENT ADULT - SLP PERTINENT HISTORY OF CURRENT PROBLEM
Pt known to this dept & was seen x3 this admission. Last seen 12/15 with RX for NPO status, This dept, re-consulted due to noted improvement in mental status

## 2020-12-16 NOTE — SWALLOW BEDSIDE ASSESSMENT ADULT - COMMENTS
As per MD note: "89y/oM PMH prostate CA s/p resection not on home medications presenting with worsening SOB. Known COVID-19 infection."

## 2020-12-16 NOTE — SWALLOW BEDSIDE ASSESSMENT ADULT - SWALLOW EVAL: RECOMMENDED FEEDING/EATING TECHNIQUES
Please discontinue PO & resume NPO status, if overt s/s aspiration noted/check mouth frequently for oral residue/pocketing/crush medication (when feasible)/maintain upright posture during/after eating for 30 mins/no straws/oral hygiene/position upright (90 degrees)/small sips/bites

## 2020-12-16 NOTE — CONSULT NOTE ADULT - SUBJECTIVE AND OBJECTIVE BOX
[facility]  INFECTIOUS DISEASES AND INTERNAL MEDICINE at [place]  =======================================================  Yang Prescott MD  Diplomates American Board of Internal Medicine and Infectious Diseases  Tel: [phone]      Fax: [phone]  =======================================================      MRN-[id number]  FELIPE AMAYA is a 89y  Male     CC: Patient is a 89y old  Male who presents with a chief complaint of COVID PNA (02 Dec 2020 11:01)    HPI:  89 year old male hx of prostate cancer s/p resection, on no home meds presents with SOB and STINSON getting worsening of symptoms after getting COVID-19. Pt in ED hypoxic on room air now on High flow oxygen. Pt currently a little confused difficult to obtain a hx. Pt admitted for COVID 19 PNA. (02 Dec 2020 11:01)    ID note-above verified. patient is axox2 unsure of why he is here, is on hi jaden but states he feels well and has no complaints    PAST MEDICAL & SURGICAL HISTORY:  Cancer of prostate  s.p resection    H/O prostate cancer  s/p resection        Social Hx: former smoker, retired     FAMILY HISTORY:  No pertinent family history in first degree relatives        Allergies    No Known Allergies    Intolerances        MEDICATIONS  (STANDING):  ALBUTerol    90 MICROgram(s) HFA Inhaler 2 Puff(s) Inhalation every 6 hours  ascorbic acid 500 milliGRAM(s) Oral every 12 hours  cholecalciferol 2000 Unit(s) Oral daily  enoxaparin Injectable 40 milliGRAM(s) SubCutaneous every 12 hours  remdesivir  IVPB   IV Intermittent   zinc sulfate 220 milliGRAM(s) Oral daily    MEDICATIONS  (PRN):      ANTIMICROBIALS:  remdesivir  IVPB        OTHER MEDS: MEDICATIONS  (STANDING):  ALBUTerol    90 MICROgram(s) HFA Inhaler 2 every 6 hours  enoxaparin Injectable 40 every 12 hours             REVIEW OF SYSTEMS:  CONSTITUTIONAL:  No Fever or chills  HEENT:  No diplopia or blurred vision.  No earache, sore throat or runny nose.  CARDIOVASCULAR:  No pressure, squeezing, strangling, tightness, heaviness or aching about the chest, neck, axilla or epigastrium.  RESPIRATORY:  No cough, shortness of breath  GASTROINTESTINAL:  No nausea, vomiting or diarrhea.  GENITOURINARY:  No dysuria, frequency or urgency. No Blood in urine  MUSCULOSKELETAL:  no joint aches, no muscle pain  SKIN:  No change in skin, hair or nails.  NEUROLOGIC:  No Headaches, seizures or weakness.  PSYCHIATRIC:  No disorder of thought or mood.  ENDOCRINE:  No heat or cold intolerance  HEMATOLOGICAL:  No easy bruising or bleeding.           I&O's Detail        Physical Exam:  Vital Signs Last 24 Hrs  T(C): 36.6 (02 Dec 2020 08:14), Max: 36.6 (02 Dec 2020 08:14)  T(F): 97.9 (02 Dec 2020 08:14), Max: 97.9 (02 Dec 2020 08:14)  HR: 76 (02 Dec 2020 09:55) (74 - 76)  BP: 133/72 (02 Dec 2020 09:55) (133/72 - 137/71)  BP(mean): --  RR: 28 (02 Dec 2020 09:55) (28 - 32)  SpO2: 94% (02 Dec 2020 09:55) (89% - 95%)  Height (cm): 182.9 (12-02 @ 07:54)  Weight (kg): 79.4 (12-02 @ 07:54)  BMI (kg/m2): 23.7 (12-02 @ 07:54)  BSA (m2): 2.01 (12-02 @ 07:54)  GEN: NAD, pleasant on hi jaden 94%  HEENT: normocephalic and atraumatic. EOMI. PERRL.  Anicteric  NECK: Supple.   LUNGS: Clear to auscultation.  HEART: Regular rate and rhythm without murmur.  ABDOMEN: Soft, nontender, and nondistended.  Positive bowel sounds.    : No CVA tenderness  EXTREMITIES: Without any edema.  MSK: No joint swelling  NEUROLOGIC: Cranial nerves II through XII are grossly intact. No Focal Deficits  PSYCHIATRIC: Appropriate affect .  SKIN: No Rash        Labs:                        14.8   5.16  )-----------( 113      ( 02 Dec 2020 08:36 )             43.6       12-02    133<L>  |  97<L>  |  25.0<H>  ----------------------------<  104<H>  3.9   |  24.0  |  0.85    Ca    8.4<L>      02 Dec 2020 08:36    TPro  6.2<L>  /  Alb  3.1<L>  /  TBili  0.5  /  DBili  x   /  AST  85<H>  /  ALT  42<H>  /  AlkPhos  38<L>  12-02        < from: Xray Chest 1 View-PORTABLE IMMEDIATE (12.02.20 @ 09:18) >  FINDINGS:    Single frontal view of the chest demonstrates diffuse bilateral infiltrates. The cardiomediastinal silhouette is enlarged. No acute osseous abnormalities. Overlying EKG leads and wires are noted    IMPRESSION: Multilobar pneumonia.      < end of copied text >    < from: CT Head No Cont (11.28.20 @ 11:57) >    FINDINGS:  There is periventricular and subcortical white matter hypodensity without mass effect, nonspecific, likely representing moderate chronic microvascular ischemic changes. There is no compelling evidence for an acute transcortical infarction. There is no evidence of mass, mass effect, midline shift or extra-axial fluid collection. The lateral ventricles and cortical sulci are age-appropriate in size and configuration. The patient is status post bilateral ocular lens replacement surgery. The orbits, mastoid air cells and visualized paranasal sinuses are normal. The calvarium is intact. Consider follow-up CT or MRI as clinically warranted.    Incidental small locules of air in the right cavernous sinus,likely iatrogenic. Correlate clinically.    IMPRESSION:  Moderate chronic microvascular changes without evidence of an acute transcortical infarction or hemorrhage.      < end of copied text >  
PULMONARY CONSULT NOTE      VITALIY AMAYA-[id number]    Patient is a 89y old  Male who presents with a chief complaint of COVID PNA (02 Dec 2020 11:01)      HISTORY OF PRESENT ILLNESS: Pt unable to give a history. Hx from chart: 89 year old male hx of prostate cancer s/p resection, on no home meds presents with SOB and STINSON getting worsening of symptoms after getting COVID-19. Pt in ED hypoxic on room air now on High flow oxygen. Pt currently a little confused difficult to obtain a hx. Pt admitted for COVID 19 PNA.      MEDICATIONS  (STANDING):  ALBUTerol    90 MICROgram(s) HFA Inhaler 2 Puff(s) Inhalation every 6 hours  ascorbic acid 500 milliGRAM(s) Oral every 12 hours  cholecalciferol 2000 Unit(s) Oral daily  enoxaparin Injectable 40 milliGRAM(s) SubCutaneous every 12 hours  zinc sulfate 220 milliGRAM(s) Oral daily      MEDICATIONS  (PRN):      Allergies    No Known Allergies    Intolerances        PAST MEDICAL & SURGICAL HISTORY:  Cancer of prostate  s.p resection    H/O prostate cancer  s/p resection        FAMILY HISTORY:  No pertinent family history in first degree relatives        SOCIAL HISTORY  Smoking History: unknown    REVIEW OF SYSTEMS:    unable to get ROS    Vital Signs Last 24 Hrs  T(C): 36.6 (02 Dec 2020 08:14), Max: 36.6 (02 Dec 2020 08:14)  T(F): 97.9 (02 Dec 2020 08:14), Max: 97.9 (02 Dec 2020 08:14)  HR: 76 (02 Dec 2020 09:55) (74 - 76)  BP: 133/72 (02 Dec 2020 09:55) (133/72 - 137/71)  BP(mean): --  RR: 28 (02 Dec 2020 09:55) (28 - 32)  SpO2: 94% (02 Dec 2020 09:55) (89% - 95%)    PHYSICAL EXAMINATION:    GENERAL: The patient is thin, frail, in no apparent distress.     HEENT: Head is normocephalic and atraumatic.   Mucous membranes are moist.     NECK: Supple.     LUNGS: Clear to auscultation without wheezing, rales, or rhonchi. Respirations unlabored    HEART: Regular rate and rhythm      ABDOMEN: Soft, nontender, and nondistended.     EXTREMITIES: Without any cyanosis, clubbing, rash, lesions or edema.    NEUROLOGIC: Oriented X 1      LABS:                        14.8   5.16  )-----------( 113      ( 02 Dec 2020 08:36 )             43.6     12-02    133<L>  |  97<L>  |  25.0<H>  ----------------------------<  104<H>  3.9   |  24.0  |  0.85    Ca    8.4<L>      02 Dec 2020 08:36    TPro  6.2<L>  /  Alb  3.1<L>  /  TBili  0.5  /  DBili  x   /  AST  85<H>  /  ALT  42<H>  /  AlkPhos  38<L>  12-02          CARDIAC MARKERS ( 02 Dec 2020 08:36 )  x     / <0.01 ng/mL / x     / x     / x               Procalcitonin, Serum: 0.13 ng/mL (12-02-20 @ 08:36)      MICROBIOLOGY:  SARS-CoV-2: Detected: This Respiratory Panel uses polymerase chain reaction (PCR) to detect for  adenovirus; coronavirus (HKU1, NL63, 229E, OC43); human metapneumovirus  (hMPV); human enterovirus/rhinovirus (Entero/RV); influenza A; influenza  A/H1; influenza A/H3; influenza A/H1-2009; influenza B; parainfluenza  viruses 1, 2, 3, 4; respiratory syncytial virus; Mycoplasma pneumoniae;  Chlamydophila pneumoniae; and SARS-CoV-2. (11.28.20 @ 10:33)        RADIOLOGY & ADDITIONAL STUDIES:  < from: Xray Chest 1 View-PORTABLE IMMEDIATE (12.02.20 @ 09:18) >     EXAM:  XR CHEST PORTABLE IMMED 1V                          PROCEDURE DATE:  12/02/2020          INTERPRETATION:  TECHNIQUE: Single portable view of the chest.    COMPARISON: 11/28/2000    CLINICAL HISTORY: Shortness of Breath, Cough,  Fever    FINDINGS:    Single frontal view of the chest demonstrates diffuse bilateral infiltrates. The cardiomediastinal silhouette is enlarged. No acute osseous abnormalities. Overlying EKG leads and wires are noted    IMPRESSION: Multilobar pneumonia.            SUGAR FLYNN MD; Attending Radiologist  This document has been electronically signed. Dec  2 2020  9:23AM    < end of copied text >  < from: CT Head No Cont (11.28.20 @ 11:57) >     EXAM:  CT BRAIN                          PROCEDURE DATE:  11/28/2020          INTERPRETATION:  CLINICAL Indications:  Headache, intracranial hemorrhage suspected  trauma, fall    COMPARISON: None.    TECHNIQUE: Noncontrast CT of the head. Multiplanar reformations are submitted.    FINDINGS:  There is periventricular and subcortical white matter hypodensity without mass effect, nonspecific, likely representing moderate chronic microvascular ischemic changes. There is no compelling evidence for an acute transcortical infarction. There is no evidence of mass, mass effect, midline shift or extra-axial fluid collection. The lateral ventricles and cortical sulci are age-appropriate in size and configuration. The patient is status post bilateral ocular lens replacement surgery. The orbits, mastoid air cells and visualized paranasal sinuses are normal. The calvarium is intact. Consider follow-up CT or MRI as clinically warranted.    Incidental small locules of air in the right cavernous sinus,likely iatrogenic. Correlate clinically.    IMPRESSION:  Moderate chronic microvascular changes without evidence of an acute transcortical infarction or hemorrhage.            SUGAR FLYNN MD; Attending Radiologist  This document has been electronically signed. Nov 28 2020 12:08PM    < end of copied text >  
Palliative Consult  HPI This is a 89 year old male PMHx prostate CA s/p resecetion admitted for SOB and STINSON after having COVID19. Patient in ED was hypoxic and placed on HFNC, confused. No reports in ED of chest pain, fever, chills, n/v/d. ID consulted for COVID interventions, pulmonaryconsulted as well. GOC has been held by primary team with Fidel (son) - Code status full code. Called for palliative care consult for further GOC due to continued failure to thrive, NPO with NGT in place due to failing Swallow evals.    <HPI:  89 year old male hx of prostate cancer s/p resection, on no home meds presents with SOB and STINSON getting worsening of symptoms after getting COVID-19. Pt in ED hypoxic on room air now on High flow oxygen. Pt currently a little confused difficult to obtain a hx. Pt admitted for COVID 19 PNA. (02 Dec 2020 11:01)> end of copied text      PERTINENT PMH REVIEWED: Yes     PAST MEDICAL & SURGICAL HISTORY:  Cancer of prostate  s.p resection    H/O prostate cancer  s/p resection        SOCIAL HISTORY:                                     Admitted from:  home      Surrogate/HCP/Guardian: Phone#: Fidel High [phone]    FAMILY HISTORY:  No family history related to admission diagonsis    Baseline ADLs (prior to admission):  Dependent      Allergies  No Known Allergies    Present Symptoms: Unable to obtain due to poor mentation  Dyspnea: 0   Nausea/Vomiting: No  Anxiety:  No  Depression: No  Fatigue: Yes   Loss of appetite: Yes     Pain: No pain reported - noted to be irritable             Character-            Duration-            Effect-            Factors-            Frequency-            Location-            Severity-    Review of Systems: Reviewed  Per HPI, all other ROS negative  Unable to obtain due to poor mentation       MEDICATIONS  (STANDING):  ALBUTerol    90 MICROgram(s) HFA Inhaler 2 Puff(s) Inhalation every 6 hours  ascorbic acid 500 milliGRAM(s) Oral every 12 hours  cholecalciferol 2000 Unit(s) Oral daily  dexAMETHasone  Injectable 6 milliGRAM(s) IV Push daily  dextrose 40% Gel 15 Gram(s) Oral once  dextrose 5% + lactated ringers. 1000 milliLiter(s) (75 mL/Hr) IV Continuous <Continuous>  dextrose 5%. 1000 milliLiter(s) (100 mL/Hr) IV Continuous <Continuous>  dextrose 5%. 1000 milliLiter(s) (50 mL/Hr) IV Continuous <Continuous>  dextrose 50% Injectable 25 Gram(s) IV Push once  dextrose 50% Injectable 12.5 Gram(s) IV Push once  dextrose 50% Injectable 25 Gram(s) IV Push once  enoxaparin Injectable 40 milliGRAM(s) SubCutaneous every 12 hours  glucagon  Injectable 1 milliGRAM(s) IntraMuscular once  insulin lispro (ADMELOG) corrective regimen sliding scale   SubCutaneous three times a day before meals  zinc sulfate 220 milliGRAM(s) Oral daily    MEDICATIONS  (PRN):  haloperidol    Injectable 2 milliGRAM(s) IntraMuscular every 6 hours PRN Agitation      Vital Signs Last 24 Hrs  T(C): 37.2 (11 Dec 2020 07:55), Max: 37.2 (11 Dec 2020 07:55)  T(F): 98.9 (11 Dec 2020 07:55), Max: 98.9 (11 Dec 2020 07:55)  HR: 71 (11 Dec 2020 09:22) (71 - 97)  BP: 148/80 (11 Dec 2020 07:55) (124/70 - 159/90)  BP(mean): --  RR: 18 (11 Dec 2020 07:55) (18 - 20)  SpO2: 99% (11 Dec 2020 09:22) (97% - 99%)    Physical Exam:  Due to the nature of this patient's COVID-19 isolation status, no bedside physical exam was done to limit spread of infection. Examination highlights were provided by bedside nurse. Objective data were reviewed in detail    LABS:                        15.6   9.22  )-----------( 245      ( 11 Dec 2020 08:47 )             47.0     12-11    142  |  112<H>  |  49.0<H>  ----------------------------<  127<H>  3.8   |  22.0  |  0.58    Ca    8.7      11 Dec 2020 08:47    TPro  5.9<L>  /  Alb  2.8<L>  /  TBili  1.3  /  DBili  0.4<H>  /  AST  33  /  ALT  33  /  AlkPhos  57  12-11    I&O's Summary    10 Dec 2020 07:01  -  11 Dec 2020 07:00  --------------------------------------------------------  IN: 0 mL / OUT: 200 mL / NET: -200 mL    11 Dec 2020 07:01  -  11 Dec 2020 09:59  --------------------------------------------------------  IN: 80 mL / OUT: 0 mL / NET: 80 mL    RADIOLOGY & ADDITIONAL STUDIES:  CXR 12.10.20  IMPRESSION: Persistent scattered lung infiltrates. NG tube in place presently.    ADVANCE DIRECTIVES:   Full Code    
                                                                    [facility]                                                               Office: [address] [address]                                                              Telephone: [phone]. Fax:[phone]                                                                        CARDIOLOGY CONSULTATION NOTE                                                                                            Chief complaint:  Unable to provide        HPI:  89M h/o prostate cancer s/p resection had sick contact with COVID at home presented to the ER for fever/malaise, tested positive on  then discharged home, returned on 20 admitted for dyspnea, hypoxic respiratory failure required HFNC, received treatment with dexamethasone/redemsivir/doxycycline for COVID-pneumonia, course complicated by delirium, dysphagia on NG-tube feed, remains COVID-19 PCR positive as of 20 but off supplemental oxygen, noted on telemetry monitor since 4pm with PVCs, trigeminies, cardiology consulted for evaluation.    Patient is AAOx1, not providing any complains, resting comfortably in bed with NG-tube in place, passed dysphagia swallow diet today. Discussed with his son (Fidel) over the phone reports no prior cardiac history, baseline "highly functional". Lab work significant for mild leukocytosis and hypoalbuminemia, normal K 4.5        REVIEW OF SYMPTOMS:   unable to provide    PREVIOUS DIAGNOSTIC TESTING    ECHO FINDINGS:  None    STRESS FINDINGS:  None    CATHETERIZATION FINDINGS:   None      ALLERGIES: Allergies    No Known Allergies        PAST MEDICAL HISTORY  Prostate cancer      PAST SURGICAL HISTORY  Prostate      FAMILY HISTORY:  unable to obtain      SOCIAL HISTORY:    CIGARETTES:   denies  ALCOHOL: denies      CURRENT MEDICATIONS:  MEDICATIONS  (STANDING):  ALBUTerol    90 MICROgram(s) HFA Inhaler 2 Puff(s) Inhalation every 6 hours  ascorbic acid 500 milliGRAM(s) Oral every 12 hours  cholecalciferol 2000 Unit(s) Oral daily  dextrose 40% Gel 15 Gram(s) Oral once  dextrose 5%. 1000 milliLiter(s) (50 mL/Hr) IV Continuous <Continuous>  dextrose 5%. 1000 milliLiter(s) (100 mL/Hr) IV Continuous <Continuous>  dextrose 50% Injectable 25 Gram(s) IV Push once  dextrose 50% Injectable 12.5 Gram(s) IV Push once  dextrose 50% Injectable 25 Gram(s) IV Push once  enoxaparin Injectable 40 milliGRAM(s) SubCutaneous daily  glucagon  Injectable 1 milliGRAM(s) IntraMuscular once  insulin lispro (ADMELOG) corrective regimen sliding scale   SubCutaneous three times a day before meals  sodium chloride 0.45%. 1000 milliLiter(s) (75 mL/Hr) IV Continuous <Continuous>    MEDICATIONS  (PRN):  haloperidol    Injectable 2 milliGRAM(s) IntraMuscular every 6 hours PRN Agitation         HOME MEDICATIONS:        Vital Signs Last 24 Hrs  T(C): 37.1 (20 @ 16:54), Max: 37.1 (20 @ 16:54)  T(F): 98.8 (20 @ 16:54), Max: 98.8 (20 @ 16:54)  HR: 98 (20 @ 16:54) (85 - 98)  BP: 117/74 (20 @ 16:54) (117/74 - 152/80)  BP(mean): --  RR: 20 (20 @ 16:54) (17 - 20)  SpO2: 99% (20 @ 16:54) (96% - 100%)  I&O's Summary      Appearance: Comfortable. No acute distress, thin  HEENT:  Head and neck: Atraumatic. Normocephalic.  Normal oral mucosa, PERRL, Neck is supple. No JVD, No carotid bruit.   Neurologic: A&Ox 1  Cardiovascular: Normal S1 S2, RRR, No murmur, rubs/gallops. No JVD, No edema  Respiratory: Lungs clear to auscultation  Gastrointestinal:  Soft, Non-tender, + BS  Lower Extremities: No edema  Psychiatry: Patient is calm. No agitation. Mood & affect appropriate  Skin: No rashes/ecchymoses/cyanosis/ulcers visualized on the face, hands or feet.      Labs:                         14.3   12.27 )-----------( 200      ( 16 Dec 2020 06:50 )             44.9     12-    145  |  110<H>  |  43.0<H>  ----------------------------<  141<H>  4.5   |  28.0  |  0.85    Ca    8.8      16 Dec 2020 06:50    TPro  6.5<L>  /  Alb  2.5<L>  /  TBili  0.7  /  DBili  0.3  /  AST  25  /  ALT  55<H>  /  AlkPhos  70  1216     02 Dec 2020 08:36 Troponin <0.01 ng/mL / Creatine Kinase x     /  CKMB x     / CPK Mass Assay % x       2020 10:33 Troponin x     / Creatine Kinase 959 U/L /  CKMB 2.6 ng/mL / CPK Mass Assay % x              A1C with Estimated Average Glucose Result: 5.9 % (20 @ 06:23)      TELEMETRY: Sinus 90-100s, PVCs, trigeminies since 4pm  EC20- Sinus 101, PVCs x5/trigeminies, LVH, QTc 420    < from: Xray Chest 1 View- PORTABLE-Urgent (Xray Chest 1 View- PORTABLE-Urgent .) (20 @ 20:52) >    INTERPRETATION:  TECHNIQUE: Single portable view of the chest.    COMPARISON: 2020 & 12/10/2020    CLINICAL HISTORY: NGT placementNGT placement    FINDINGS:    Single frontal view of the chest demonstrates feeding tube tip is below the hemidiaphragm within the stomach. Bilateral lower lobe infiltrates, unchanged. The cardiomediastinal silhouette is normal. No acute osseous abnormalities. Overlying EKG leads and wires are noted    IMPRESSION: Multilobar pneumonia. Feeding tube tip below the hemidiaphragm within the stomach.    < end of copied text >

## 2020-12-16 NOTE — SWALLOW BEDSIDE ASSESSMENT ADULT - ADDITIONAL RECOMMENDATIONS
Consider non-oral means of nutrition/hydration as per pt/family wishes
Will follow, as schedule permits

## 2020-12-16 NOTE — SWALLOW BEDSIDE ASSESSMENT ADULT - ASR SWALLOW ASPIRATION MONITOR
position upright (89Y)/cough/fever/pneumonia/throat clearing/upper respiratory infection/change of breathing pattern/gurgly voice/oral hygiene
change of breathing pattern/oral hygiene/position upright (89Y)/cough/gurgly voice/fever/pneumonia/throat clearing/upper respiratory infection

## 2020-12-16 NOTE — PROGRESS NOTE ADULT - ATTENDING COMMENTS
More alert and responsive today.  Started on Puree with [facility] after SLP evaluation.    Discussed with patient daughters (Manuela and Ashely) as well as communicated with son Fidel - all questions answered

## 2020-12-16 NOTE — PROGRESS NOTE ADULT - ASSESSMENT
89y/oM PMH prostate CA s/p resection not on home medications presenting with worsening SOB. Known COVID-19 infection. Seen in [facility] ER on 11/28.     #Acute hypoxic respiratory failure 2/2 COVID-19 PNA   - No longer hypoxic, on room air and off supplemental O2, PCR(+)  - ID following, s/p remdesivir and decadron, now discontinued   - Pulmonary following  - Albuterol MDI  - Incentive spirometry, OOB, Chest PT  - PT following    #Metabolic encephalopathy in setting of infection, failure to thrive  - This morning pt more awake, eyes open, answering questions though still pleasantly confused. Previously pt extremely weak, fragile, not participating with feeds  - Spoke to S&S, they will reevaluate patient again today   - For now c/w NGT. As per discussion with family yesterday, would entertain peg tube if needed. GI consulted 12/15  - Palliative consult re-exploring goals of care and options in case of no improvement      #Transaminitis in setting of COVID-19 and Remdesivir   - Improved  - Monitor    #Thrombocytopenia   - Likely 2/2 infection, now resolved    #Functional Quadriplegia   - Daily PT ordered     DVT ppx sq Lovenox  Daily PT  Prognosis, guarded     Advanced Directives: Full code for now. Palliative consult given continued failure to thrive re-exploring goals of care and options in case of no improvement..      Disposition - Pending clinical improvement and PCR. Per discussions with family, would entertain peg tube if needed. S&S to reevaluate pt today. Further palliative discussions to continue. Daughters updated by MD at bedside this am.

## 2020-12-17 LAB
ALBUMIN SERPL ELPH-MCNC: 2.4 G/DL — LOW (ref 3.3–5.2)
ALP SERPL-CCNC: 81 U/L — SIGNIFICANT CHANGE UP (ref 40–120)
ALT FLD-CCNC: 66 U/L — HIGH
ANION GAP SERPL CALC-SCNC: 9 MMOL/L — SIGNIFICANT CHANGE UP (ref 5–17)
AST SERPL-CCNC: 37 U/L — SIGNIFICANT CHANGE UP
BILIRUB DIRECT SERPL-MCNC: 0.2 MG/DL — SIGNIFICANT CHANGE UP (ref 0–0.3)
BILIRUB INDIRECT FLD-MCNC: 0.5 MG/DL — SIGNIFICANT CHANGE UP (ref 0.2–1)
BILIRUB SERPL-MCNC: 0.8 MG/DL — SIGNIFICANT CHANGE UP (ref 0.4–2)
BUN SERPL-MCNC: 51 MG/DL — HIGH (ref 8–20)
CALCIUM SERPL-MCNC: 9.2 MG/DL — SIGNIFICANT CHANGE UP (ref 8.6–10.2)
CHLORIDE SERPL-SCNC: 110 MMOL/L — HIGH (ref 98–107)
CO2 SERPL-SCNC: 27 MMOL/L — SIGNIFICANT CHANGE UP (ref 22–29)
CREAT SERPL-MCNC: 0.98 MG/DL — SIGNIFICANT CHANGE UP (ref 0.5–1.3)
D DIMER BLD IA.RAPID-MCNC: 457 NG/ML DDU — HIGH
GLUCOSE BLDC GLUCOMTR-MCNC: 106 MG/DL — HIGH (ref 70–99)
GLUCOSE BLDC GLUCOMTR-MCNC: 109 MG/DL — HIGH (ref 70–99)
GLUCOSE BLDC GLUCOMTR-MCNC: 95 MG/DL — SIGNIFICANT CHANGE UP (ref 70–99)
GLUCOSE BLDC GLUCOMTR-MCNC: 99 MG/DL — SIGNIFICANT CHANGE UP (ref 70–99)
GLUCOSE SERPL-MCNC: 120 MG/DL — HIGH (ref 70–99)
HCT VFR BLD CALC: 45.4 % — SIGNIFICANT CHANGE UP (ref 39–50)
HGB BLD-MCNC: 14.6 G/DL — SIGNIFICANT CHANGE UP (ref 13–17)
MAGNESIUM SERPL-MCNC: 2.9 MG/DL — HIGH (ref 1.8–2.6)
MCHC RBC-ENTMCNC: 28.7 PG — SIGNIFICANT CHANGE UP (ref 27–34)
MCHC RBC-ENTMCNC: 32.2 GM/DL — SIGNIFICANT CHANGE UP (ref 32–36)
MCV RBC AUTO: 89.2 FL — SIGNIFICANT CHANGE UP (ref 80–100)
NT-PROBNP SERPL-SCNC: 204 PG/ML — SIGNIFICANT CHANGE UP (ref 0–300)
PHOSPHATE SERPL-MCNC: 3.9 MG/DL — SIGNIFICANT CHANGE UP (ref 2.4–4.7)
PLATELET # BLD AUTO: 202 K/UL — SIGNIFICANT CHANGE UP (ref 150–400)
POTASSIUM SERPL-MCNC: 5.1 MMOL/L — SIGNIFICANT CHANGE UP (ref 3.5–5.3)
POTASSIUM SERPL-SCNC: 5.1 MMOL/L — SIGNIFICANT CHANGE UP (ref 3.5–5.3)
PROT SERPL-MCNC: 6.5 G/DL — LOW (ref 6.6–8.7)
RBC # BLD: 5.09 M/UL — SIGNIFICANT CHANGE UP (ref 4.2–5.8)
RBC # FLD: 15.1 % — HIGH (ref 10.3–14.5)
SODIUM SERPL-SCNC: 146 MMOL/L — HIGH (ref 135–145)
TROPONIN T SERPL-MCNC: 0.02 NG/ML — SIGNIFICANT CHANGE UP (ref 0–0.06)
TSH SERPL-MCNC: 0.87 UIU/ML — SIGNIFICANT CHANGE UP (ref 0.27–4.2)
WBC # BLD: 11.99 K/UL — HIGH (ref 3.8–10.5)
WBC # FLD AUTO: 11.99 K/UL — HIGH (ref 3.8–10.5)

## 2020-12-17 PROCEDURE — 99231 SBSQ HOSP IP/OBS SF/LOW 25: CPT

## 2020-12-17 PROCEDURE — 99232 SBSQ HOSP IP/OBS MODERATE 35: CPT

## 2020-12-17 RX ORDER — ALBUTEROL 90 UG/1
2 AEROSOL, METERED ORAL
Qty: 1 | Refills: 0
Start: 2020-12-17 | End: 2021-01-15

## 2020-12-17 RX ORDER — CHOLECALCIFEROL (VITAMIN D3) 125 MCG
2000 CAPSULE ORAL
Qty: 0 | Refills: 0 | DISCHARGE
Start: 2020-12-17

## 2020-12-17 RX ORDER — MEGESTROL ACETATE 40 MG/ML
400 SUSPENSION ORAL DAILY
Refills: 0 | Status: DISCONTINUED | OUTPATIENT
Start: 2020-12-17 | End: 2020-12-18

## 2020-12-17 RX ORDER — SODIUM CHLORIDE 9 MG/ML
1000 INJECTION, SOLUTION INTRAVENOUS
Refills: 0 | Status: COMPLETED | OUTPATIENT
Start: 2020-12-17 | End: 2020-12-17

## 2020-12-17 RX ORDER — ASCORBIC ACID 60 MG
1 TABLET,CHEWABLE ORAL
Qty: 0 | Refills: 0 | DISCHARGE
Start: 2020-12-17

## 2020-12-17 RX ADMIN — Medication 500 MILLIGRAM(S): at 17:58

## 2020-12-17 RX ADMIN — ALBUTEROL 2 PUFF(S): 90 AEROSOL, METERED ORAL at 02:00

## 2020-12-17 RX ADMIN — MEGESTROL ACETATE 400 MILLIGRAM(S): 40 SUSPENSION ORAL at 21:44

## 2020-12-17 RX ADMIN — SODIUM CHLORIDE 83 MILLILITER(S): 9 INJECTION, SOLUTION INTRAVENOUS at 18:26

## 2020-12-17 RX ADMIN — ENOXAPARIN SODIUM 40 MILLIGRAM(S): 100 INJECTION SUBCUTANEOUS at 11:43

## 2020-12-17 RX ADMIN — Medication 500 MILLIGRAM(S): at 05:24

## 2020-12-17 RX ADMIN — Medication 2000 UNIT(S): at 11:43

## 2020-12-17 RX ADMIN — SODIUM CHLORIDE 75 MILLILITER(S): 9 INJECTION, SOLUTION INTRAVENOUS at 10:26

## 2020-12-17 NOTE — DISCHARGE NOTE PROVIDER - NSDCCPCAREPLAN_GEN_ALL_CORE_FT
PRINCIPAL DISCHARGE DIAGNOSIS  Diagnosis: Pneumonia due to COVID-19 virus  Assessment and Plan of Treatment: Resolved      SECONDARY DISCHARGE DIAGNOSES  Diagnosis: Acute hypoxemic respiratory failure  Assessment and Plan of Treatment: Resolved    Diagnosis: Hypernatremia  Assessment and Plan of Treatment: Encourage adequate oral fluid intake    Diagnosis: Dysphagia  Assessment and Plan of Treatment: Continue diet as prescribed

## 2020-12-17 NOTE — PROGRESS NOTE ADULT - SUBJECTIVE AND OBJECTIVE BOX
HOSPITALIST PROGRESS NOTE    FELIPE AMAYA  [id number]  89yMale    Patient is a 89y old  Male who presents with a chief complaint of COVID PNA (14 Dec 2020 17:56)    Overnight: yesterday afternoon noted with new bigeminy and PVCs on monitor. EKG w/ first degree block (new from prior ekg which was NSR). Trop, bnp wnl. Cardio consulted     SUBJECTIVE:   Chart reviewed since last visit.  Patient seen and examined at bedside for encephalopathy dysphagia, COVID-19.  More awake again this morning, responds to questions, pleasantly confused however follows commands   Unable to obtain ROS due to the above  Per RN at bedside pt reportedly did very well with eating dinner last night    OBJECTIVE:  Vital Signs Last 24 Hrs  T(C): 36.6 (17 Dec 2020 04:47), Max: 37.1 (16 Dec 2020 16:54)  T(F): 97.8 (17 Dec 2020 04:47), Max: 98.8 (16 Dec 2020 16:54)  HR: 101 (17 Dec 2020 04:47) (90 - 101)  BP: 138/82 (17 Dec 2020 04:47) (117/74 - 138/82)  BP(mean): --  RR: 18 (17 Dec 2020 04:47) (18 - 20)  SpO2: 100% (17 Dec 2020 04:47) (99% - 100%) on RA    PHYSICAL EXAMINATION  General: Elderly male, lying in bed, awake, eyes open today, NAD  HEENT: Eyes open, NGT L nostril  NECK:  supple  CVS: regular rate and rhythm S1 S2  RESP:  Poor effort - CTAB  GI:  Soft nondistended nontender BS+  : No suprapubic or CVA tenderness  MSK:  Moves extremities.  CNS: A&Ox3, follows commands today and more awake, eyes open   INTEG: Warm dry skin  PSYCH:  Somnolent     LABS:                           14.6   11.99 )-----------( 202      ( 17 Dec 2020 07:32 )             45.4   12-17    146<H>  |  110<H>  |  51.0<H>  ----------------------------<  120<H>  5.1   |  27.0  |  0.98    Ca    9.2      17 Dec 2020 07:32  Phos  3.9     12-17  Mg     2.9     12-17    TPro  6.5<L>  /  Alb  2.4<L>  /  TBili  0.8  /  DBili  0.2  /  AST  37  /  ALT  66<H>  /  AlkPhos  81  12-17      MEDICATIONS  (STANDING):  ALBUTerol    90 MICROgram(s) HFA Inhaler 2 Puff(s) Inhalation every 6 hours  ascorbic acid 500 milliGRAM(s) Oral every 12 hours  cholecalciferol 2000 Unit(s) Oral daily  dextrose 40% Gel 15 Gram(s) Oral once  dextrose 5%. 1000 milliLiter(s) (100 mL/Hr) IV Continuous <Continuous>  dextrose 5%. 1000 milliLiter(s) (50 mL/Hr) IV Continuous <Continuous>  dextrose 50% Injectable 25 Gram(s) IV Push once  dextrose 50% Injectable 12.5 Gram(s) IV Push once  dextrose 50% Injectable 25 Gram(s) IV Push once  enoxaparin Injectable 40 milliGRAM(s) SubCutaneous daily  glucagon  Injectable 1 milliGRAM(s) IntraMuscular once  insulin lispro (ADMELOG) corrective regimen sliding scale   SubCutaneous three times a day before meals  sodium chloride 0.45%. 1000 milliLiter(s) (75 mL/Hr) IV Continuous <Continuous>    MEDICATIONS  (PRN):  haloperidol    Injectable 2 milliGRAM(s) IntraMuscular every 6 hours PRN Agitation         HOSPITALIST PROGRESS NOTE    FELIPE AAMYA  [id number]  89yMale    Patient is a 89y old  Male who presents with a chief complaint of COVID PNA (14 Dec 2020 17:56)    Overnight: yesterday afternoon noted with new bigeminy and PVCs on monitor. EKG w/ first degree block (new from prior ekg which was NSR). Trop, bnp wnl. Cardio consulted     SUBJECTIVE:   Chart reviewed since last visit.  Patient seen and examined at bedside for encephalopathy dysphagia, COVID-19.  More awake again this morning, responds to questions, pleasantly confused however follows commands   Unable to obtain ROS due to the above  Per RN at bedside pt reportedly did very well with eating dinner last night    OBJECTIVE:  Vital Signs Last 24 Hrs  T(C): 36.6 (17 Dec 2020 04:47), Max: 37.1 (16 Dec 2020 16:54)  T(F): 97.8 (17 Dec 2020 04:47), Max: 98.8 (16 Dec 2020 16:54)  HR: 101 (17 Dec 2020 04:47) (90 - 101)  BP: 138/82 (17 Dec 2020 04:47) (117/74 - 138/82)   RR: 18 (17 Dec 2020 04:47) (18 - 20)  SpO2: 100% (17 Dec 2020 04:47) (99% - 100%) on RA    PHYSICAL EXAMINATION  General: Elderly male, lying in bed, awake, eyes open today, NAD  HEENT: Eyes open, NGT L nostril  NECK:  supple  CVS: regular rate and rhythm S1 S2  RESP:  Poor effort - CTAB  GI:  Soft nondistended nontender BS+  : No suprapubic or CVA tenderness  MSK:  Moves extremities.  CNS: A&Ox3, follows commands today and more awake, eyes open   INTEG: Warm dry skin  PSYCH:  Somnolent     LABS:                           14.6   11.99 )-----------( 202      ( 17 Dec 2020 07:32 )             45.4   12-17    146<H>  |  110<H>  |  51.0<H>  ----------------------------<  120<H>  5.1   |  27.0  |  0.98    Ca    9.2      17 Dec 2020 07:32  Phos  3.9     12-17  Mg     2.9     12-17    TPro  6.5<L>  /  Alb  2.4<L>  /  TBili  0.8  /  DBili  0.2  /  AST  37  /  ALT  66<H>  /  AlkPhos  81  12-17      MEDICATIONS  (STANDING):  ALBUTerol    90 MICROgram(s) HFA Inhaler 2 Puff(s) Inhalation every 6 hours  ascorbic acid 500 milliGRAM(s) Oral every 12 hours  cholecalciferol 2000 Unit(s) Oral daily  dextrose 40% Gel 15 Gram(s) Oral once  dextrose 5%. 1000 milliLiter(s) (100 mL/Hr) IV Continuous <Continuous>  dextrose 5%. 1000 milliLiter(s) (50 mL/Hr) IV Continuous <Continuous>  dextrose 50% Injectable 25 Gram(s) IV Push once  dextrose 50% Injectable 12.5 Gram(s) IV Push once  dextrose 50% Injectable 25 Gram(s) IV Push once  enoxaparin Injectable 40 milliGRAM(s) SubCutaneous daily  glucagon  Injectable 1 milliGRAM(s) IntraMuscular once  insulin lispro (ADMELOG) corrective regimen sliding scale   SubCutaneous three times a day before meals  sodium chloride 0.45%. 1000 milliLiter(s) (75 mL/Hr) IV Continuous <Continuous>    MEDICATIONS  (PRN):  haloperidol    Injectable 2 milliGRAM(s) IntraMuscular every 6 hours PRN Agitation

## 2020-12-17 NOTE — PROGRESS NOTE ADULT - ASSESSMENT
89y/oM PMH prostate CA s/p resection not on home medications presenting with worsening SOB. Known COVID-19 infection. Seen in [facility] ER on 11/28.     #Acute hypoxic respiratory failure 2/2 COVID-19 PNA   - No longer hypoxic, on room air and off supplemental O2, PCR(+)  - S/p remdesivir and decadron, now discontinued   - ID and pulm consults appreciated  - Albuterol MDI  - Incentive spirometry, OOB, Chest PT    #Metabolic encephalopathy in setting of infection, failure to thrive, dysphagic   - This morning pt more awake, eyes open, answering questions though still pleasantly confused. Previously pt extremely weak, fragile, not participating with feeds  - Pt was reevaluted by S&S yesterday 12/16, cleared for pureed diet, tolerating well per RN. Will hold off on further NGT feeds if pt continues to tolerate PO diet  - Gentle hydration with IVF   - Palliative care following      #Transaminitis in setting of COVID-19 and Remdesivir   - Improved  - Monitor    #Thrombocytopenia   - Likely 2/2 infection, now resolved    #Functional Quadriplegia   - Daily PT ordered     DVT ppx sq Lovenox  Daily PT  Prognosis, guarded     Advanced Directives: Full code for now. Palliative consult given continued failure to thrive re-exploring goals of care and options in case of no improvement..      Disposition - Pending clinical improvement and PCR. Per discussions with family, would entertain peg tube if needed however pt currently tolerating PO diet. Daily PT      89y/oM PMH prostate CA s/p resection not on home medications presenting with worsening SOB. Known COVID-19 infection. Seen in [facility] ER on 11/28.     #Acute hypoxic respiratory failure 2/2 COVID-19 PNA   - No longer hypoxic, on room air and off supplemental O2, PCR(+)  - S/p remdesivir and decadron, now discontinued   - ID and pulm consults appreciated  - Albuterol MDI  - Incentive spirometry, OOB, Chest PT    #Metabolic encephalopathy in setting of infection, failure to thrive, dysphagic   - This morning pt more awake, eyes open, answering questions though still pleasantly confused. Previously pt extremely weak, fragile, not participating with feeds  - Pt was reevaluted by S&S yesterday 12/16, cleared for pureed diet, tolerating well per RN. Will hold off on further NGT feeds if pt continues to tolerate PO diet  - Gentle hydration with IVF   - Palliative care following      #Transaminitis in setting of COVID-19 and Remdesivir   - Improved  - Monitor    #Thrombocytopenia   - Likely 2/2 infection, now resolved    #Functional Quadriplegia   - Daily PT ordered     DVT ppx sq Lovenox  Daily PT  Prognosis, guarded     Advanced Directives: Full code for now. Palliative following.      Disposition - Pending clinical improvement and PCR. Patient tolerated dinner last night, will see how patient does today with oral feeds. Discussed plan with son, aware patient is eating again and very happy. Planning to take patient home with 24/7 assist as soon as pt is cleared for discharge.

## 2020-12-17 NOTE — DISCHARGE NOTE NURSING/CASE MANAGEMENT/SOCIAL WORK - PATIENT PORTAL LINK FT
You can access the FollowMyHealth Patient Portal offered by [facility] by registering at the following website: [email or web address]. By joining [employer]’s FollowMyHealth portal, you will also be able to view your health information using other applications (apps) compatible with our system.

## 2020-12-17 NOTE — PROGRESS NOTE ADULT - ATTENDING COMMENTS
Discussed with patient daughter Ashely who was facetimed earlier - happy that patient on diet    Communicated with Son Fidel - plan for discharge home in am

## 2020-12-17 NOTE — DISCHARGE NOTE PROVIDER - NSDCFUADDINST_GEN_ALL_CORE_FT
It has been determined that you no longer need hospitalization and can recover while remaining in self-quarantine at home for 14 days. You should follow the prevention steps below until a healthcare provider or [facility] says you can return to your normal activities.  Please remain in quarantine until then. You should restrict activities outside your home except for getting medical care.  Do not go to work, school or public areas.  Avoid using public transportation.  Separate yourself from other people and animals in your home.  Cover your coughs and sneezes.  Clean your hands often. Avoid sharing personal household items. Clean all high touch surfaces. Monitor your symptoms, if you have a medical emergency call 911.

## 2020-12-17 NOTE — CHART NOTE - NSCHARTNOTEFT_GEN_A_CORE
Palliative NP sign off  ---------------------  Patient is now tolerating diet   Per my last conversation with Fidel he was eager to have his father come back home on discharge  Major impact on the patient's discharge may be due to whether Fidel will be able to provide the level of care that's needed for his father -Discharge planning followed by case management RN  Code status: Full Code  Surrogate: Fidel High (son)  Will sign off at this time. GOC are established - if GOC change or patient's condition decompensates requiring Palliative Care Assistance for re-discussion on GOC - please feel free to reconsult  Family Goal: Return home     Thank you for the opportunity to assist with the care of this patient.   [facility] Consult Service [phone].

## 2020-12-17 NOTE — DISCHARGE NOTE PROVIDER - CARE PROVIDER_API CALL
Thee Metzger)  Hematology; Internal Medicine  [address] [place] [address]  Phone: [phone]  Fax: [phone]  Follow Up Time:

## 2020-12-17 NOTE — CHART NOTE - NSCHARTNOTEFT_GEN_A_CORE
Reviewed telemetry monitor HR 90-100s, no recurrence of PVCs or trigeminies since yesterday.   -pBNP and Troponin negative, not indicated for TTE  -TSH normal as well  -continue observe off medical therapy given intermittent asymptomatic PVCs.   -no further cardiac workup indicated at this point. Reviewed telemetry monitor HR 90-100s, no recurrence of PVCs or trigeminies since yesterday.   -pBNP and Troponin negative, defer TTE to limit staffs exposure given remains COVID+   -TSH normal as well  -continue observe off medical therapy given intermittent asymptomatic PVCs.   -no further cardiac workup indicated at this point.

## 2020-12-17 NOTE — DISCHARGE NOTE PROVIDER - HOSPITAL COURSE
89 year old male with PMH Prostate CA s/p resection presented with worsening dyspnea and hypoxic to 89% with multifocal pneumonia on Chest X-Ray admitted for Acute Hypoxic respiratory failure secondary to COVID-19 pneumonia. He was treated with Supplemental O2, Decadron as well as Remdesivir in consultation with ID.    Hospital course significant for Respiratory failure requiring HFNC eventually weaned down to room air. He was also noted to have dysphagia and was made NPO and TF started via NGT. Eventually passed swaloow evaluation and resumed on diet.    His COVID PCR remains positive and family taking him home. Discharge time 32 minutes

## 2020-12-17 NOTE — DISCHARGE NOTE PROVIDER - NSDCMRMEDTOKEN_GEN_ALL_CORE_FT
ascorbic acid 500 mg oral tablet: 1 tab(s) orally every 12 hours  cholecalciferol oral tablet: 2000 unit(s) orally once a day

## 2020-12-18 VITALS
DIASTOLIC BLOOD PRESSURE: 84 MMHG | HEART RATE: 85 BPM | SYSTOLIC BLOOD PRESSURE: 155 MMHG | TEMPERATURE: 98 F | OXYGEN SATURATION: 100 % | RESPIRATION RATE: 18 BRPM

## 2020-12-18 LAB
ANION GAP SERPL CALC-SCNC: 9 MMOL/L — SIGNIFICANT CHANGE UP (ref 5–17)
BUN SERPL-MCNC: 45 MG/DL — HIGH (ref 8–20)
CALCIUM SERPL-MCNC: 9 MG/DL — SIGNIFICANT CHANGE UP (ref 8.6–10.2)
CHLORIDE SERPL-SCNC: 111 MMOL/L — HIGH (ref 98–107)
CO2 SERPL-SCNC: 26 MMOL/L — SIGNIFICANT CHANGE UP (ref 22–29)
CREAT SERPL-MCNC: 0.79 MG/DL — SIGNIFICANT CHANGE UP (ref 0.5–1.3)
GLUCOSE BLDC GLUCOMTR-MCNC: 122 MG/DL — HIGH (ref 70–99)
GLUCOSE BLDC GLUCOMTR-MCNC: 136 MG/DL — HIGH (ref 70–99)
GLUCOSE SERPL-MCNC: 95 MG/DL — SIGNIFICANT CHANGE UP (ref 70–99)
POTASSIUM SERPL-MCNC: 4.3 MMOL/L — SIGNIFICANT CHANGE UP (ref 3.5–5.3)
POTASSIUM SERPL-SCNC: 4.3 MMOL/L — SIGNIFICANT CHANGE UP (ref 3.5–5.3)
SODIUM SERPL-SCNC: 146 MMOL/L — HIGH (ref 135–145)

## 2020-12-18 PROCEDURE — 99239 HOSP IP/OBS DSCHRG MGMT >30: CPT

## 2020-12-18 PROCEDURE — 99231 SBSQ HOSP IP/OBS SF/LOW 25: CPT

## 2020-12-18 RX ORDER — ALBUTEROL 90 UG/1
2 AEROSOL, METERED ORAL
Qty: 1 | Refills: 0
Start: 2020-12-18 | End: 2021-01-16

## 2020-12-18 RX ORDER — METOPROLOL TARTRATE 50 MG
25 TABLET ORAL DAILY
Refills: 0 | Status: DISCONTINUED | OUTPATIENT
Start: 2020-12-18 | End: 2020-12-18

## 2020-12-18 RX ORDER — METOPROLOL TARTRATE 50 MG
1 TABLET ORAL
Qty: 30 | Refills: 0
Start: 2020-12-18 | End: 2021-01-16

## 2020-12-18 RX ORDER — MEGESTROL ACETATE 40 MG/ML
10 SUSPENSION ORAL
Qty: 300 | Refills: 0
Start: 2020-12-18 | End: 2021-01-16

## 2020-12-18 RX ADMIN — HALOPERIDOL DECANOATE 2 MILLIGRAM(S): 100 INJECTION INTRAMUSCULAR at 11:28

## 2020-12-18 RX ADMIN — Medication 500 MILLIGRAM(S): at 05:28

## 2020-12-18 RX ADMIN — ENOXAPARIN SODIUM 40 MILLIGRAM(S): 100 INJECTION SUBCUTANEOUS at 11:43

## 2020-12-18 RX ADMIN — Medication 2000 UNIT(S): at 11:28

## 2020-12-18 RX ADMIN — MEGESTROL ACETATE 400 MILLIGRAM(S): 40 SUSPENSION ORAL at 11:27

## 2020-12-18 NOTE — CHART NOTE - NSCHARTNOTEFT_GEN_A_CORE
Reviewed telemetry monitor noted still with episodes of PVCs and trigeminy last night and 3 beats of NSVT, overall HR 90-100s and -150s/80s, overall normal electrolytes on AM lab today.   -will add metoprolol succinate 25mg daily  -patient can follow up with cardiology in my office as outpatient  -no further inpatient cardiac testing indicated for now.         Den Locke DO, [facility]  Faculty Non-Invasive Cardiologist  [phone]

## 2020-12-18 NOTE — PROGRESS NOTE ADULT - REASON FOR ADMISSION
COVID PNA

## 2020-12-18 NOTE — PROGRESS NOTE ADULT - SUBJECTIVE AND OBJECTIVE BOX
HOSPITALIST PROGRESS NOTE    FELIPE AMAYA  [id number]  89yMale    Patient is a 89y old  Male who presents with a chief complaint of COVID PNA (17 Dec 2020 17:36)      SUBJECTIVE:   Chart reviewed since last visit.  Patient seen and examined at bedside for COVID PCR(+), weakness  Wants to go home      OBJECTIVE:  Vital Signs Last 24 Hrs  T(C): 36.9 (18 Dec 2020 09:06), Max: 36.9 (17 Dec 2020 15:38)  T(F): 98.4 (18 Dec 2020 09:06), Max: 98.4 (17 Dec 2020 15:38)  HR: 85 (18 Dec 2020 09:06) (85 - 102)  BP: 155/84 (18 Dec 2020 09:06) (141/87 - 155/84)   RR: 18 (18 Dec 2020 09:06) (18 - 18)  SpO2: 100% (18 Dec 2020 09:06) (96% - 100%)    PHYSICAL EXAMINATION  General: Elderly male, lying in bed, awake, eyes open today, NAD  HEENT: Eyes open, NGT L nostril  NECK:  supple  CVS: regular rate and rhythm S1 S2  RESP:  Poor effort - CTAB  GI:  Soft nondistended nontender BS+  : No suprapubic or CVA tenderness  MSK:  Moves extremities.  CNS: A&Ox3, follows commands , moves all extremities though weak  INTEG: Warm dry skin  PSYCH:  Somnolent    MONITOR:  CAPILLARY BLOOD GLUCOSE      POCT Blood Glucose.: 136 mg/dL (18 Dec 2020 11:22)  POCT Blood Glucose.: 122 mg/dL (18 Dec 2020 08:07)  POCT Blood Glucose.: 99 mg/dL (17 Dec 2020 21:31)  POCT Blood Glucose.: 95 mg/dL (17 Dec 2020 16:18)        I&O's Summary                          14.6   11.99 )-----------( 202      ( 17 Dec 2020 07:32 )             45.4       12-18    146<H>  |  111<H>  |  45.0<H>  ----------------------------<  95  4.3   |  26.0  |  0.79    Ca    9.0      18 Dec 2020 09:18  Phos  3.9     12-17  Mg     2.9     12-17    TPro  6.5<L>  /  Alb  2.4<L>  /  TBili  0.8  /  DBili  0.2  /  AST  37  /  ALT  66<H>  /  AlkPhos  81  12-17    CARDIAC MARKERS ( 17 Dec 2020 07:32 )  x     / 0.02 ng/mL / x     / x     / x              Culture:    TTE:    RADIOLOGY        MEDICATIONS  (STANDING):  ALBUTerol    90 MICROgram(s) HFA Inhaler 2 Puff(s) Inhalation every 6 hours  ascorbic acid 500 milliGRAM(s) Oral every 12 hours  cholecalciferol 2000 Unit(s) Oral daily  dextrose 40% Gel 15 Gram(s) Oral once  dextrose 5%. 1000 milliLiter(s) (50 mL/Hr) IV Continuous <Continuous>  dextrose 5%. 1000 milliLiter(s) (100 mL/Hr) IV Continuous <Continuous>  dextrose 50% Injectable 25 Gram(s) IV Push once  dextrose 50% Injectable 12.5 Gram(s) IV Push once  dextrose 50% Injectable 25 Gram(s) IV Push once  enoxaparin Injectable 40 milliGRAM(s) SubCutaneous daily  glucagon  Injectable 1 milliGRAM(s) IntraMuscular once  insulin lispro (ADMELOG) corrective regimen sliding scale   SubCutaneous three times a day before meals  megestrol Suspension 400 milliGRAM(s) Oral daily  metoprolol succinate ER 25 milliGRAM(s) Oral daily      MEDICATIONS  (PRN):  haloperidol    Injectable 2 milliGRAM(s) IntraMuscular every 6 hours PRN Agitation     HOSPITALIST PROGRESS NOTE    FELIPE AMAYA  [id number]  89yMale    Patient is a 89y old  Male who presents with a chief complaint of COVID PNA (17 Dec 2020 17:36)      SUBJECTIVE:   Chart reviewed since last visit.  Patient seen and examined at bedside for COVID PCR(+), weakness  Wants to go home.  Denies any dyspnea, chest pain, palpitations, fever or chills      OBJECTIVE:  Vital Signs Last 24 Hrs  T(C): 36.9 (18 Dec 2020 09:06), Max: 36.9 (17 Dec 2020 15:38)  T(F): 98.4 (18 Dec 2020 09:06), Max: 98.4 (17 Dec 2020 15:38)  HR: 85 (18 Dec 2020 09:06) (85 - 102)  BP: 155/84 (18 Dec 2020 09:06) (141/87 - 155/84)   RR: 18 (18 Dec 2020 09:06) (18 - 18)  SpO2: 100% (18 Dec 2020 09:06) (96% - 100%)    PHYSICAL EXAMINATION  General: Elderly male, lying in bed, awake, eyes open today, NAD  HEENT: Eyes open, NGT L nostril  NECK:  supple  CVS: regular rate and rhythm S1 S2  RESP:  Poor effort - CTAB  GI:  Soft nondistended nontender BS+  : No suprapubic or CVA tenderness  MSK:  Moves extremities.  CNS: A&Ox3, follows commands , moves all extremities though weak  INTEG: Warm dry skin  PSYCH:  Somnolent    MONITOR:  CAPILLARY BLOOD GLUCOSE      POCT Blood Glucose.: 136 mg/dL (18 Dec 2020 11:22)  POCT Blood Glucose.: 122 mg/dL (18 Dec 2020 08:07)  POCT Blood Glucose.: 99 mg/dL (17 Dec 2020 21:31)  POCT Blood Glucose.: 95 mg/dL (17 Dec 2020 16:18)        I&O's Summary                          14.6   11.99 )-----------( 202      ( 17 Dec 2020 07:32 )             45.4       12-18    146<H>  |  111<H>  |  45.0<H>  ----------------------------<  95  4.3   |  26.0  |  0.79    Ca    9.0      18 Dec 2020 09:18  Phos  3.9     12-17  Mg     2.9     12-17    TPro  6.5<L>  /  Alb  2.4<L>  /  TBili  0.8  /  DBili  0.2  /  AST  37  /  ALT  66<H>  /  AlkPhos  81  12-17    CARDIAC MARKERS ( 17 Dec 2020 07:32 )  x     / 0.02 ng/mL / x     / x     / x              Culture:    TTE:    RADIOLOGY        MEDICATIONS  (STANDING):  ALBUTerol    90 MICROgram(s) HFA Inhaler 2 Puff(s) Inhalation every 6 hours  ascorbic acid 500 milliGRAM(s) Oral every 12 hours  cholecalciferol 2000 Unit(s) Oral daily  dextrose 40% Gel 15 Gram(s) Oral once  dextrose 5%. 1000 milliLiter(s) (50 mL/Hr) IV Continuous <Continuous>  dextrose 5%. 1000 milliLiter(s) (100 mL/Hr) IV Continuous <Continuous>  dextrose 50% Injectable 25 Gram(s) IV Push once  dextrose 50% Injectable 12.5 Gram(s) IV Push once  dextrose 50% Injectable 25 Gram(s) IV Push once  enoxaparin Injectable 40 milliGRAM(s) SubCutaneous daily  glucagon  Injectable 1 milliGRAM(s) IntraMuscular once  insulin lispro (ADMELOG) corrective regimen sliding scale   SubCutaneous three times a day before meals  megestrol Suspension 400 milliGRAM(s) Oral daily  metoprolol succinate ER 25 milliGRAM(s) Oral daily      MEDICATIONS  (PRN):  haloperidol    Injectable 2 milliGRAM(s) IntraMuscular every 6 hours PRN Agitation

## 2020-12-18 NOTE — PROGRESS NOTE ADULT - PROVIDER SPECIALTY LIST ADULT
Hospitalist
Infectious Disease
Palliative Care
Pulmonology
Pulmonology
Hospitalist
Hospitalist

## 2020-12-18 NOTE — CHART NOTE - NSCHARTNOTESELECT_GEN_ALL_CORE
Cardiology- Telemetry review/Event Note
Event Note
Palliative NP/Event Note
Nutrition Services
Nutrition Services
Cardiology- Telemetry/Event Note

## 2020-12-18 NOTE — PROGRESS NOTE ADULT - ASSESSMENT
89y/oM PMH prostate CA s/p resection not on home medications presenting with worsening SOB. Known COVID-19 infection. Seen in [facility] ER on 11/28.     #Acute hypoxic respiratory failure 2/2 COVID-19 PNA   - No longer hypoxic, on room air and off supplemental O2, PCR(+)  - S/p remdesivir and decadron, now discontinued   - ID and pulm consults appreciated  - Albuterol MDI  - Incentive spirometry, OOB, Chest PT    #Metabolic encephalopathy in setting of infection, failure to thrive, dysphagic - now on diet  - Palliative care following      #Transaminitis in setting of COVID-19 and Remdesivir   - Improved  - Monitor    #Thrombocytopenia   - Likely 2/2 infection, now resolved    #Functional Quadriplegia   - Daily PT ordered     DVT ppx sq Lovenox  Daily PT  Prognosis, guarded     Advanced Directives: Full code for now. Palliative following.      Disposition - discharge home with equipment     89y/oM PMH prostate CA s/p resection not on home medications presenting with worsening SOB. Known COVID-19 infection. Seen in [facility] ER on 11/28.     #Acute hypoxic respiratory failure 2/2 COVID-19 PNA - No longer hypoxic, on room air and off supplemental O2, PCR(+)  - S/p remdesivir and decadron.  - Continue Albuterol MDI, Incentive spirometry, OOB, Chest PT    #Metabolic encephalopathy in setting of infection, failure to thrive, dysphagic - now on diet    PVC - started on BB by Cardiology. Outpatient follow up     #Transaminitis in setting of COVID-19 and Remdesivir - improved     #Thrombocytopenia Likely 2/2 infection, now resolved    #Functional Quadriplegia   - Daily PT ordered     DVT ppx sq Lovenox  Daily PT  Prognosis, guarded     Advanced Directives: Full code for now. Palliative following.      Disposition - discharge home with equipment

## 2020-12-18 NOTE — PROGRESS NOTE ADULT - NSHPATTENDINGPLANDISCUSS_GEN_ALL_CORE
patient, GREGORIO Ramsey and LIBRA Zepeda
patient, RN, PA and CCC
Patient, family, GREGORIO Quinn and LIBRA Bach
patient, family, GREGORIO Whitman, CCC Regine
Patient RN GoldmannA, LIBRA Vazquez and Palliative  NP Jessie

## 2020-12-18 NOTE — CHART NOTE - NSCHARTNOTEFT_GEN_A_CORE
Discussed with CCC and PT. Pt needs lon lift and the hospital bed. Discussed with CCC and PT. Pt needs lon lift and the hospital bed.  Pt. needs hospital bed due to  Pt. requires frequent and immediate position changes that are not feasable in a regular bed and pillows.  Pt. needs head of bed elevated more than 30 degrees most of time due to Discussed with CCC and PT. Pt needs oln lift and the hospital bed.    Pt. needs hospital bed due to multiple medical problems, Acute hypoxic respiratory failure 2/2 COVID-19 PNA,  Metabolic encephalopathy, Functional Quadriplegia,  new bigeminy and PVCsnew bigeminy and PVCs.  Pt. requires frequent and immediate position changes that are not feasible in a regular bed and pillows.  Pt. needs head of bed elevated more than 30 degrees most of time due to Aspiration risk, Acute hypoxic respiratory failure 2/2 COVID-19 PNA,  and Metabolic encephalopathy      Patient requires lon lift needs transfer between a bed and a chair, or commode, the patient would be bed confined.

## 2020-12-28 PROCEDURE — 93005 ELECTROCARDIOGRAM TRACING: CPT

## 2020-12-28 PROCEDURE — 82962 GLUCOSE BLOOD TEST: CPT

## 2020-12-28 PROCEDURE — 84145 PROCALCITONIN (PCT): CPT

## 2020-12-28 PROCEDURE — 84100 ASSAY OF PHOSPHORUS: CPT

## 2020-12-28 PROCEDURE — 97163 PT EVAL HIGH COMPLEX 45 MIN: CPT

## 2020-12-28 PROCEDURE — 86850 RBC ANTIBODY SCREEN: CPT

## 2020-12-28 PROCEDURE — U0003: CPT

## 2020-12-28 PROCEDURE — 85379 FIBRIN DEGRADATION QUANT: CPT

## 2020-12-28 PROCEDURE — 71045 X-RAY EXAM CHEST 1 VIEW: CPT

## 2020-12-28 PROCEDURE — 94640 AIRWAY INHALATION TREATMENT: CPT

## 2020-12-28 PROCEDURE — 97530 THERAPEUTIC ACTIVITIES: CPT

## 2020-12-28 PROCEDURE — 84443 ASSAY THYROID STIM HORMONE: CPT

## 2020-12-28 PROCEDURE — 83735 ASSAY OF MAGNESIUM: CPT

## 2020-12-28 PROCEDURE — 86900 BLOOD TYPING SEROLOGIC ABO: CPT

## 2020-12-28 PROCEDURE — 80053 COMPREHEN METABOLIC PANEL: CPT

## 2020-12-28 PROCEDURE — 92610 EVALUATE SWALLOWING FUNCTION: CPT

## 2020-12-28 PROCEDURE — 85027 COMPLETE CBC AUTOMATED: CPT

## 2020-12-28 PROCEDURE — 86901 BLOOD TYPING SEROLOGIC RH(D): CPT

## 2020-12-28 PROCEDURE — 83036 HEMOGLOBIN GLYCOSYLATED A1C: CPT

## 2020-12-28 PROCEDURE — 36600 WITHDRAWAL OF ARTERIAL BLOOD: CPT

## 2020-12-28 PROCEDURE — 82565 ASSAY OF CREATININE: CPT

## 2020-12-28 PROCEDURE — 80076 HEPATIC FUNCTION PANEL: CPT

## 2020-12-28 PROCEDURE — 86769 SARS-COV-2 COVID-19 ANTIBODY: CPT

## 2020-12-28 PROCEDURE — 82728 ASSAY OF FERRITIN: CPT

## 2020-12-28 PROCEDURE — 85025 COMPLETE CBC W/AUTO DIFF WBC: CPT

## 2020-12-28 PROCEDURE — 85652 RBC SED RATE AUTOMATED: CPT

## 2020-12-28 PROCEDURE — 36415 COLL VENOUS BLD VENIPUNCTURE: CPT

## 2020-12-28 PROCEDURE — 83880 ASSAY OF NATRIURETIC PEPTIDE: CPT

## 2020-12-28 PROCEDURE — 86140 C-REACTIVE PROTEIN: CPT

## 2020-12-28 PROCEDURE — 96375 TX/PRO/DX INJ NEW DRUG ADDON: CPT

## 2020-12-28 PROCEDURE — 97110 THERAPEUTIC EXERCISES: CPT

## 2020-12-28 PROCEDURE — 82803 BLOOD GASES ANY COMBINATION: CPT

## 2020-12-28 PROCEDURE — 99285 EMERGENCY DEPT VISIT HI MDM: CPT | Mod: 25

## 2020-12-28 PROCEDURE — 93970 EXTREMITY STUDY: CPT

## 2020-12-28 PROCEDURE — 84484 ASSAY OF TROPONIN QUANT: CPT

## 2020-12-28 PROCEDURE — 83615 LACTATE (LD) (LDH) ENZYME: CPT

## 2020-12-28 PROCEDURE — 92526 ORAL FUNCTION THERAPY: CPT

## 2020-12-28 PROCEDURE — 80048 BASIC METABOLIC PNL TOTAL CA: CPT

## 2020-12-28 PROCEDURE — 96365 THER/PROPH/DIAG IV INF INIT: CPT

## 2020-12-28 PROCEDURE — 94760 N-INVAS EAR/PLS OXIMETRY 1: CPT

## 2021-01-03 ENCOUNTER — EMERGENCY (EMERGENCY)
Facility: HOSPITAL | Age: 86
LOS: 1 days | Discharge: HOME | End: 2021-01-03
Attending: STUDENT IN AN ORGANIZED HEALTH CARE EDUCATION/TRAINING PROGRAM
Payer: MEDICARE

## 2021-01-03 VITALS
TEMPERATURE: 98 F | HEIGHT: 68 IN | WEIGHT: 169.98 LBS | RESPIRATION RATE: 18 BRPM | OXYGEN SATURATION: 98 % | DIASTOLIC BLOOD PRESSURE: 82 MMHG | HEART RATE: 83 BPM | SYSTOLIC BLOOD PRESSURE: 142 MMHG

## 2021-01-03 DIAGNOSIS — Z85.46 PERSONAL HISTORY OF MALIGNANT NEOPLASM OF PROSTATE: Chronic | ICD-10-CM

## 2021-01-03 PROCEDURE — 99285 EMERGENCY DEPT VISIT HI MDM: CPT

## 2021-01-03 PROCEDURE — 93010 ELECTROCARDIOGRAM REPORT: CPT

## 2021-01-03 NOTE — ED ADULT TRIAGE NOTE - CHIEF COMPLAINT QUOTE
biba c/o dysphagia, irregular heart beat and bradycardia. pt became bradycardic at the house, ekg performed en route. pt states he "feels something in his throat." Pt denies any s/s of acute distress. Denies any cp, sob or difficulty breathing.

## 2021-01-03 NOTE — ED PROVIDER NOTE - PATIENT PORTAL LINK FT
You can access the FollowMyHealth Patient Portal offered by Health system by registering at the following website: [email or web address]. By joining [employer]’s FollowMyHealth portal, you will also be able to view your health information using other applications (apps) compatible with our system.

## 2021-01-03 NOTE — ED PROVIDER NOTE - OBJECTIVE STATEMENT
89 y/o M pt with significant PMHx of prostate CA presents to the ED c/o weakness that is limiting his ability to walk. Further reports trouble swallowing and "gagging". Explains his family is struggling to take care of him. Denies pain, n/v, . Never had a stroke, MI, . No further complaints at this time. 89 y/o M pt with significant PMHx of prostate CA presents to the ED c/o trouble swallowing and episodes "gagging" in which he feels like he cannot breath. Further reports bilateral weakness that is affecting his ability to ambulate. Explains his family is struggling to take care of him. Denies pain, n/v. Never had a stroke, MI. No further complaints at this time. 89 y/o M pt with significant PMHx of prostate CA presents to the ED c/o trouble swallowing and episodes "gagging" in which he feels like he cannot breath.  Patient states the his has been a recurrent issue for many years. He was seen by three different "throat doctors" whom  found not cause to the recurrent episodes. Patient states they typically occur after eating and will resolve with blowing his nose. He further reports bilateral weakness that is affecting his ability to ambulate since giovany covid and being hospitalized. Explains his family is struggling to take care of him. Denies pain, n/v. Never had a stroke, MI. No further complaints at this time.

## 2021-01-03 NOTE — ED PROVIDER NOTE - PROGRESS NOTE DETAILS
Spoke to pt's son, Fidel. Explains father is making much progress. Pt was bedbound but is now walking with walker. Mentation back to baseline. States father has episodes where feels like he can't breath for a couple minutes. Tonight episode occurred but lasted a couple more minutes than normal which promoted them to send him to ER. Explains every time it is nerve racking to witness even though he has been evaluated and no cause has been found. Since they were not sure, they figured it would be better to send him to ER for evaluation. Denies coughing, sneezing, CP, change in mental status. Seems to get better with blowing his nose. After discussion of workup with pt and son, we agreed if findings are normal we are ok with pt returning home with outpatient follow up. Patient continues to remain comfortably. No noticeable change in labs. Patient to be discharged and f/u with PMD.

## 2021-01-03 NOTE — ED PROVIDER NOTE - NSFOLLOWUPINSTRUCTIONS_ED_ALL_ED_FT
1) Follow up with your doctor in 1-2 days  2) Return to the ER for worsening or concerning symptoms      Choking, Adult      Choking occurs when a food or object gets stuck in the throat or windpipe (trachea) and blocks the airway. If the airway is partly blocked, coughing will usually cause the food or object to come out. If the airway is completely blocked, immediate action is needed to make it come out. The kind of treatment you offer depends on the severity of the choking.      Signs of choking  A person has a complete airway blockage if he or she:  •Is unable to breathe.      •Is making soft or high-pitched sounds while breathing.      •Is unable to cough or is coughing weakly, ineffectively, or silently.      •Is unable to cry, speak, or make sounds.      •Is turning blue or grey.      •Is holding his or her neck with both hands. This is considered a universal sign of choking.      •Nods "yes" when asked if he or she is choking.        Follow these instructions at home:    For partial airway blockage   If a person has a partial airway blockage and he or she is coughing and is able to speak:  •Do not interfere. Allow coughing to clear the airway.      •Do not let him or her try to drink until the food or object comes out.      •Stay with the person until the food or object comes out. Watch for signs of choking (complete airway blockage). If the person shows signs of complete airway blockage, you should take action to help the person.      For complete airway blockage    If a person has a complete airway blockage, his or her life is in danger. Choking causes breathing to stop. This is a medical emergency that requires fast, appropriate action by anyone who is available.  To save a person who is chokin.Encourage the person to cough. If this does not relieve the blockage, go to Step 2.      2.Perform a Heimlich maneuver. The Heimlich maneuver, also called abdominal thrusts, uses pressure to force air from the abdomen into the throat to move a blockage.      Heimlich maneuver for choking    For a person who is sitting or standin.Ask the person whether he or she is choking. If the person nods or gives the universal sign of choking, continue to step 2.      2.Shout for help. If someone responds, tell that person to call local emergency services (911 in the [place]      3.Stand or kneel behind the person who is choking and lean him or her forward slightly.      4.Make a fist with one hand, put your arms around the person's midsection, and grasp your fist with your other hand. Place the thumb side of your fist against the person's abdomen, just below the ribs and above the belly button area.    5.Quickly and firmly press inward (toward you) and upward with both hands. Repeat this 5 times as necessary.  •If the person is pregnant or obese and you cannot wrap your arms around the person's midsection, wrap your arms around the chest (under the armpits), place your hands over the breastbone, and do chest thrusts.        6.Repeat this maneuver until the object comes out and the person is able to breathe, or until the person becomes unconscious.      7.Anyone who has been given the Heimlich maneuver should be seen by a health care provider after the event.    For a person who is unconscious (if the choking person collapses or is found on the ground and you know the person was choking):1.Shout for help.  •If someone responds, tell that person to call local emergency services (211 in [place] and look for an AED.      •If no one responds, call local emergency services (697 in the [place] yourself, if possible.      2.Make sure the person is lying on a firm, flat surface, facing up. Begin CPR, starting with 30 compressions and 2 breaths. Every time you open the airway to give rescue breaths, open the person's mouth. If you can see the food or object and it can be easily pulled out, remove it with your fingers. Remember:  •Do not stick your fingers into the victim's throat as this may cause the food or object to go in farther.      •Do not try to remove the food or object if you cannot see it. Blind finger sweeps can push it farther into the airway.        3.After 5 cycles or 2 minutes of CPR, call local emergency services (214 in [place] if a call has not already been made.      4.Continue CPR until the person starts breathing or until help arrives.      If you are chokin.Call local emergency services (911 in [place] Do not worry about communicating what is happening. Do not hang up the phone. Someone may be sent to help you anyway.      2.Make a fist with one hand. Put the thumb side of the fist against your abdomen, just above the belly button and well below the breastbone. If you are pregnant or obese, put your fist on your chest instead, just below the breastbone and just above your lowest ribs.      3.Hold your fist with your other hand and bend over a hard surface, such as a table or chair.      4.Forcefully push your fist in and up.      5.Continue to do this until the food or object comes out.      Prevention    •Chew food thoroughly.      •Avoid talking while you are chewing and swallowing.      To be prepared if choking occurs, take a certified first-aid course to learn how to correctly perform the Heimlich maneuver.      Contact a health care provider if:    •You have trouble swallowing food.        Get help right away if:    •You have problems breathing after choking stops.      •You were given the Heimlich maneuver.        Summary    •Choking occurs when a food or object gets stuck in the throat (trachea) and blocks the airway.      •If a person has a partial airway blockage and is able to talk and cough, do not interfere and do not allow the person to drink until the food or object comes out.      •If a person has a complete airway blockage, perform the Heimlich maneuver. Call local emergency services and take the person to a health care provider afterward.      •If the person is unconscious, call local emergency services and perform CPR until the person breathes normally or until help arrives.      This information is not intended to replace advice given to you by your health care provider. Make sure you discuss any questions you have with your health care provider.

## 2021-01-04 VITALS
OXYGEN SATURATION: 97 % | DIASTOLIC BLOOD PRESSURE: 65 MMHG | RESPIRATION RATE: 18 BRPM | HEART RATE: 77 BPM | SYSTOLIC BLOOD PRESSURE: 121 MMHG | TEMPERATURE: 98 F

## 2021-01-04 PROBLEM — C61 MALIGNANT NEOPLASM OF PROSTATE: Chronic | Status: ACTIVE | Noted: 2020-12-02

## 2021-01-04 LAB
ALBUMIN SERPL ELPH-MCNC: 3.1 G/DL — LOW (ref 3.3–5.2)
ALP SERPL-CCNC: 57 U/L — SIGNIFICANT CHANGE UP (ref 40–120)
ALT FLD-CCNC: 26 U/L — SIGNIFICANT CHANGE UP
ANION GAP SERPL CALC-SCNC: 8 MMOL/L — SIGNIFICANT CHANGE UP (ref 5–17)
AST SERPL-CCNC: 37 U/L — SIGNIFICANT CHANGE UP
BASOPHILS # BLD AUTO: 0.03 K/UL — SIGNIFICANT CHANGE UP (ref 0–0.2)
BASOPHILS NFR BLD AUTO: 0.4 % — SIGNIFICANT CHANGE UP (ref 0–2)
BILIRUB SERPL-MCNC: 0.3 MG/DL — LOW (ref 0.4–2)
BUN SERPL-MCNC: 19 MG/DL — SIGNIFICANT CHANGE UP (ref 8–20)
CALCIUM SERPL-MCNC: 9 MG/DL — SIGNIFICANT CHANGE UP (ref 8.6–10.2)
CHLORIDE SERPL-SCNC: 101 MMOL/L — SIGNIFICANT CHANGE UP (ref 98–107)
CK SERPL-CCNC: 78 U/L — SIGNIFICANT CHANGE UP (ref 30–200)
CO2 SERPL-SCNC: 25 MMOL/L — SIGNIFICANT CHANGE UP (ref 22–29)
CREAT SERPL-MCNC: 0.76 MG/DL — SIGNIFICANT CHANGE UP (ref 0.5–1.3)
EOSINOPHIL # BLD AUTO: 0.2 K/UL — SIGNIFICANT CHANGE UP (ref 0–0.5)
EOSINOPHIL NFR BLD AUTO: 2.7 % — SIGNIFICANT CHANGE UP (ref 0–6)
ETHANOL SERPL-MCNC: <10 MG/DL — HIGH (ref 0–9)
GLUCOSE SERPL-MCNC: 121 MG/DL — HIGH (ref 70–99)
HCT VFR BLD CALC: 38.2 % — LOW (ref 39–50)
HGB BLD-MCNC: 12.5 G/DL — LOW (ref 13–17)
IMM GRANULOCYTES NFR BLD AUTO: 0.8 % — SIGNIFICANT CHANGE UP (ref 0–1.5)
LIDOCAIN IGE QN: 151 U/L — HIGH (ref 22–51)
LYMPHOCYTES # BLD AUTO: 1.94 K/UL — SIGNIFICANT CHANGE UP (ref 1–3.3)
LYMPHOCYTES # BLD AUTO: 25.7 % — SIGNIFICANT CHANGE UP (ref 13–44)
MAGNESIUM SERPL-MCNC: 2.1 MG/DL — SIGNIFICANT CHANGE UP (ref 1.6–2.6)
MCHC RBC-ENTMCNC: 29.3 PG — SIGNIFICANT CHANGE UP (ref 27–34)
MCHC RBC-ENTMCNC: 32.7 GM/DL — SIGNIFICANT CHANGE UP (ref 32–36)
MCV RBC AUTO: 89.7 FL — SIGNIFICANT CHANGE UP (ref 80–100)
MONOCYTES # BLD AUTO: 0.84 K/UL — SIGNIFICANT CHANGE UP (ref 0–0.9)
MONOCYTES NFR BLD AUTO: 11.1 % — SIGNIFICANT CHANGE UP (ref 2–14)
NEUTROPHILS # BLD AUTO: 4.47 K/UL — SIGNIFICANT CHANGE UP (ref 1.8–7.4)
NEUTROPHILS NFR BLD AUTO: 59.3 % — SIGNIFICANT CHANGE UP (ref 43–77)
PLATELET # BLD AUTO: 305 K/UL — SIGNIFICANT CHANGE UP (ref 150–400)
POTASSIUM SERPL-MCNC: 5 MMOL/L — SIGNIFICANT CHANGE UP (ref 3.5–5.3)
POTASSIUM SERPL-SCNC: 5 MMOL/L — SIGNIFICANT CHANGE UP (ref 3.5–5.3)
PROT SERPL-MCNC: 6.6 G/DL — SIGNIFICANT CHANGE UP (ref 6.6–8.7)
RBC # BLD: 4.26 M/UL — SIGNIFICANT CHANGE UP (ref 4.2–5.8)
RBC # FLD: 16.3 % — HIGH (ref 10.3–14.5)
SARS-COV-2 RNA SPEC QL NAA+PROBE: SIGNIFICANT CHANGE UP
SODIUM SERPL-SCNC: 134 MMOL/L — LOW (ref 135–145)
TROPONIN T SERPL-MCNC: 0.01 NG/ML — SIGNIFICANT CHANGE UP (ref 0–0.06)
TROPONIN T SERPL-MCNC: 0.02 NG/ML — SIGNIFICANT CHANGE UP (ref 0–0.06)
TSH SERPL-MCNC: 2.39 UIU/ML — SIGNIFICANT CHANGE UP (ref 0.27–4.2)
WBC # BLD: 7.54 K/UL — SIGNIFICANT CHANGE UP (ref 3.8–10.5)
WBC # FLD AUTO: 7.54 K/UL — SIGNIFICANT CHANGE UP (ref 3.8–10.5)

## 2021-01-04 PROCEDURE — 71045 X-RAY EXAM CHEST 1 VIEW: CPT | Mod: 26

## 2021-01-04 PROCEDURE — 71045 X-RAY EXAM CHEST 1 VIEW: CPT

## 2021-01-04 PROCEDURE — 36415 COLL VENOUS BLD VENIPUNCTURE: CPT

## 2021-01-04 PROCEDURE — 84484 ASSAY OF TROPONIN QUANT: CPT

## 2021-01-04 PROCEDURE — 83690 ASSAY OF LIPASE: CPT

## 2021-01-04 PROCEDURE — U0003: CPT

## 2021-01-04 PROCEDURE — 80307 DRUG TEST PRSMV CHEM ANLYZR: CPT

## 2021-01-04 PROCEDURE — 82550 ASSAY OF CK (CPK): CPT

## 2021-01-04 PROCEDURE — 99284 EMERGENCY DEPT VISIT MOD MDM: CPT | Mod: 25

## 2021-01-04 PROCEDURE — U0005: CPT

## 2021-01-04 PROCEDURE — 84443 ASSAY THYROID STIM HORMONE: CPT

## 2021-01-04 PROCEDURE — 80053 COMPREHEN METABOLIC PANEL: CPT

## 2021-01-04 PROCEDURE — 85025 COMPLETE CBC W/AUTO DIFF WBC: CPT

## 2021-01-04 PROCEDURE — 93005 ELECTROCARDIOGRAM TRACING: CPT

## 2021-01-04 PROCEDURE — 83735 ASSAY OF MAGNESIUM: CPT

## 2021-01-04 RX ORDER — SODIUM CHLORIDE 9 MG/ML
1000 INJECTION INTRAMUSCULAR; INTRAVENOUS; SUBCUTANEOUS ONCE
Refills: 0 | Status: COMPLETED | OUTPATIENT
Start: 2021-01-04 | End: 2021-01-04

## 2021-01-04 RX ADMIN — SODIUM CHLORIDE 1000 MILLILITER(S): 9 INJECTION INTRAMUSCULAR; INTRAVENOUS; SUBCUTANEOUS at 00:22

## 2021-01-04 RX ADMIN — Medication 30 MILLILITER(S): at 00:22

## 2021-01-04 NOTE — ED ADULT NURSE NOTE - NS ED NURSE LEVEL OF CONSCIOUSNESS SPEECH
Signed                                 [x]Clarington Walker [id number]          [address] [address]     [address] [place] [address]                                           [facility] [address], [address]                                              [place]     [phone] [phone] [phone]  ________________________________________________________________________  Occupational Therapy Daily Treatment Note     Date:  3/29/2019  Patient Name:  Jorge Alberto Iqbal                  :  1939  Restrictions/Precautions:  Fall risk  Diagnosis:   PD  Treatment Diagnosis:  weakness  Insurance/Certification information:  Medicare  Referring Physician:   Severiano Diamond  Plan of care signed (Y/N):    Visit# / total visits:   Pain level:      10       Subjective: pt. States does not know yet what they are going to do about intestinal issues. States still does not feel well. States he feels walking and balance is better. Treatment:  Daily Exercises:   Exercise Reps Effort Rating Comments   Sustained Movements (sitting) Floor to Ceiling, 10 ct 8   Verbal cues, visual    Side to Side, 10 ct, R/L 8  Verbal cues, visual,positioning   Multidirectional Repetitive Movements (standing) Forward step/reach, R/L 10  Verbal cues  Worked with pt. In // bars so he could use both hands and ex. Unsupported; Less off balance    Sideways step/reach, R/L 10  Mod verbal cues, visual    Backwards step/reach, R/L 10  Min verbal cues    Forward/Backward Rock & Reach, R/L 10  Mod verbal cues-for reach    Side to Side Rock & Reach, R/L 10  Min verbal cues for arms       Maximal Functional Component Movements  Functional Task Repetitions Effort Comments   1. Sit to stand  5 6 Unable to do first couple of tries   2.walk through obstacle course 5 6 Able to do with concentration   3.  Lifting legs and turning like getting in and out of car 5 6 States has difficulty getting in and out of car. 4. Bounce ball in // bars 20     5. Floor ladder: forward and side stepping Down and back         Hierarchy Practice   Minutes Practiced:__________    Effort: __________              BIG Walking  Big walking x 5 min , then has to rest due to hip pain -same    NuStep x15 min    Treatment Comments:      Performance on Daily Tasks? (ie., % modeling/cueing; duration; initiation; fatigue)   Uncued Trials? Cued Trials? Spontaneous \"off the cuff\" BIGNESS? (in between exercises)    Calibration: Do they report self-correction? Self-perception changes? When. Special Concerns: Pain/ROM/ability to follow cues? Motivation? Fatigue? Still fatigues easily from not feeling well. Able to follow cues. Appears motivated    OTHER:       Carryover Assignments:     Objective Findings:      Communication with other providers:  fax'd POC to physician     Education provided to patient:     Adverse Reactions to treatment:  none     Timed Code Treatment Minutes:  60     Total Treatment Minutes:  60     Treatment/Activity Tolerance:           [x]  Patient tolerated treatment well        []  Patient limited by fatique                    []  Patient limited by pain          []  Patient limited by other medical complications          []  Other:      Goals:    Time Frame for Long term goals : 6 weeks  Long term goal 1: Pt. will be able to complete BIG exercises at home with min vc from wife.   Long term goal 2: Pt. will report increase ease in completing ADLs  Long term goal 3: Pt. will decrease TUG time to reduce risk of falls.           Patient Requires Follow-up:             [x]  Yes               []  No     Plan:    [x]  Continue per plan of care     []  Alter current plan (see comments)     []  Plan of care initiated []  Hold pending MD visit           []  Discharge     Plan for Next Session:       Electronically signed by: Elvis DIAZ/BETO Speaking Coherently

## 2021-01-04 NOTE — ED ADULT NURSE NOTE - OBJECTIVE STATEMENT
Pt is A&Ox4, in NAD. Presents to ED c/o difficulty swallowing at home. Pt states this happens frequently, however he feels something was stuck in his throat. Denies SOB or difficulty speaking. Pt is speaking in clear, full sentences. States he tested positive for COVID about a month ago and was hospitalized here for it. Denies any symptoms. Skin si warm, dry and color appropriate for race. Pt to be discharged as long as repeat troponin is negative. Will continue to monitor.

## 2021-12-16 ENCOUNTER — EMERGENCY (EMERGENCY)
Facility: HOSPITAL | Age: 86
LOS: 1 days | Discharge: HOME | End: 2021-12-16
Attending: EMERGENCY MEDICINE
Payer: MEDICARE

## 2021-12-16 VITALS
TEMPERATURE: 101 F | DIASTOLIC BLOOD PRESSURE: 69 MMHG | HEIGHT: 68 IN | HEART RATE: 81 BPM | WEIGHT: 149.91 LBS | RESPIRATION RATE: 20 BRPM | OXYGEN SATURATION: 95 % | SYSTOLIC BLOOD PRESSURE: 135 MMHG

## 2021-12-16 VITALS
HEART RATE: 73 BPM | SYSTOLIC BLOOD PRESSURE: 164 MMHG | OXYGEN SATURATION: 97 % | TEMPERATURE: 100 F | DIASTOLIC BLOOD PRESSURE: 68 MMHG | RESPIRATION RATE: 18 BRPM

## 2021-12-16 DIAGNOSIS — Z85.46 PERSONAL HISTORY OF MALIGNANT NEOPLASM OF PROSTATE: Chronic | ICD-10-CM

## 2021-12-16 LAB
ALBUMIN SERPL ELPH-MCNC: 3.6 G/DL — SIGNIFICANT CHANGE UP (ref 3.3–5.2)
ALP SERPL-CCNC: 54 U/L — SIGNIFICANT CHANGE UP (ref 40–120)
ALT FLD-CCNC: 17 U/L — SIGNIFICANT CHANGE UP
ANION GAP SERPL CALC-SCNC: 13 MMOL/L — SIGNIFICANT CHANGE UP (ref 5–17)
APPEARANCE UR: ABNORMAL
AST SERPL-CCNC: 23 U/L — SIGNIFICANT CHANGE UP
BACTERIA # UR AUTO: ABNORMAL
BASE EXCESS BLDV CALC-SCNC: 1.8 MMOL/L — SIGNIFICANT CHANGE UP (ref -2–3)
BASOPHILS # BLD AUTO: 0.02 K/UL — SIGNIFICANT CHANGE UP (ref 0–0.2)
BASOPHILS NFR BLD AUTO: 0.3 % — SIGNIFICANT CHANGE UP (ref 0–2)
BILIRUB SERPL-MCNC: 0.6 MG/DL — SIGNIFICANT CHANGE UP (ref 0.4–2)
BILIRUB UR-MCNC: NEGATIVE — SIGNIFICANT CHANGE UP
BUN SERPL-MCNC: 26 MG/DL — HIGH (ref 8–20)
CA-I SERPL-SCNC: 1.1 MMOL/L — LOW (ref 1.15–1.33)
CALCIUM SERPL-MCNC: 8.3 MG/DL — LOW (ref 8.6–10.2)
CHLORIDE BLDV-SCNC: 105 MMOL/L — SIGNIFICANT CHANGE UP (ref 98–107)
CHLORIDE SERPL-SCNC: 103 MMOL/L — SIGNIFICANT CHANGE UP (ref 98–107)
CO2 SERPL-SCNC: 23 MMOL/L — SIGNIFICANT CHANGE UP (ref 22–29)
COLOR SPEC: YELLOW — SIGNIFICANT CHANGE UP
CREAT SERPL-MCNC: 1.25 MG/DL — SIGNIFICANT CHANGE UP (ref 0.5–1.3)
DIFF PNL FLD: ABNORMAL
EOSINOPHIL # BLD AUTO: 0 K/UL — SIGNIFICANT CHANGE UP (ref 0–0.5)
EOSINOPHIL NFR BLD AUTO: 0 % — SIGNIFICANT CHANGE UP (ref 0–6)
EPI CELLS # UR: SIGNIFICANT CHANGE UP
GAS PNL BLDV: 136 MMOL/L — SIGNIFICANT CHANGE UP (ref 136–145)
GAS PNL BLDV: SIGNIFICANT CHANGE UP
GLUCOSE BLDV-MCNC: 125 MG/DL — HIGH (ref 70–99)
GLUCOSE SERPL-MCNC: 121 MG/DL — HIGH (ref 70–99)
GLUCOSE UR QL: NEGATIVE — SIGNIFICANT CHANGE UP
HCO3 BLDV-SCNC: 26 MMOL/L — SIGNIFICANT CHANGE UP (ref 22–29)
HCT VFR BLD CALC: 39.7 % — SIGNIFICANT CHANGE UP (ref 39–50)
HCT VFR BLDA CALC: 40 % — SIGNIFICANT CHANGE UP (ref 39–51)
HGB BLD CALC-MCNC: 13.4 G/DL — SIGNIFICANT CHANGE UP (ref 12.6–17.4)
HGB BLD-MCNC: 13.2 G/DL — SIGNIFICANT CHANGE UP (ref 13–17)
IMM GRANULOCYTES NFR BLD AUTO: 0.3 % — SIGNIFICANT CHANGE UP (ref 0–1.5)
KETONES UR-MCNC: NEGATIVE — SIGNIFICANT CHANGE UP
LACTATE BLDV-MCNC: 1.1 MMOL/L — SIGNIFICANT CHANGE UP (ref 0.5–2)
LEUKOCYTE ESTERASE UR-ACNC: ABNORMAL
LYMPHOCYTES # BLD AUTO: 1.29 K/UL — SIGNIFICANT CHANGE UP (ref 1–3.3)
LYMPHOCYTES # BLD AUTO: 17.3 % — SIGNIFICANT CHANGE UP (ref 13–44)
MCHC RBC-ENTMCNC: 29.5 PG — SIGNIFICANT CHANGE UP (ref 27–34)
MCHC RBC-ENTMCNC: 33.2 GM/DL — SIGNIFICANT CHANGE UP (ref 32–36)
MCV RBC AUTO: 88.8 FL — SIGNIFICANT CHANGE UP (ref 80–100)
MONOCYTES # BLD AUTO: 0.89 K/UL — SIGNIFICANT CHANGE UP (ref 0–0.9)
MONOCYTES NFR BLD AUTO: 11.9 % — SIGNIFICANT CHANGE UP (ref 2–14)
NEUTROPHILS # BLD AUTO: 5.25 K/UL — SIGNIFICANT CHANGE UP (ref 1.8–7.4)
NEUTROPHILS NFR BLD AUTO: 70.2 % — SIGNIFICANT CHANGE UP (ref 43–77)
NITRITE UR-MCNC: POSITIVE
PCO2 BLDV: 35 MMHG — LOW (ref 42–55)
PH BLDV: 7.47 — HIGH (ref 7.32–7.43)
PH UR: 6 — SIGNIFICANT CHANGE UP (ref 5–8)
PLATELET # BLD AUTO: 118 K/UL — LOW (ref 150–400)
PO2 BLDV: 175 MMHG — HIGH (ref 25–45)
POTASSIUM BLDV-SCNC: 4.1 MMOL/L — SIGNIFICANT CHANGE UP (ref 3.5–5.1)
POTASSIUM SERPL-MCNC: 4.1 MMOL/L — SIGNIFICANT CHANGE UP (ref 3.5–5.3)
POTASSIUM SERPL-SCNC: 4.1 MMOL/L — SIGNIFICANT CHANGE UP (ref 3.5–5.3)
PROT SERPL-MCNC: 6.9 G/DL — SIGNIFICANT CHANGE UP (ref 6.6–8.7)
PROT UR-MCNC: 100
RAPID RVP RESULT: SIGNIFICANT CHANGE UP
RBC # BLD: 4.47 M/UL — SIGNIFICANT CHANGE UP (ref 4.2–5.8)
RBC # FLD: 13.5 % — SIGNIFICANT CHANGE UP (ref 10.3–14.5)
RBC CASTS # UR COMP ASSIST: ABNORMAL /HPF (ref 0–4)
SAO2 % BLDV: 100 % — SIGNIFICANT CHANGE UP
SARS-COV-2 RNA SPEC QL NAA+PROBE: SIGNIFICANT CHANGE UP
SODIUM SERPL-SCNC: 139 MMOL/L — SIGNIFICANT CHANGE UP (ref 135–145)
SP GR SPEC: 1.01 — SIGNIFICANT CHANGE UP (ref 1.01–1.02)
UROBILINOGEN FLD QL: NEGATIVE — SIGNIFICANT CHANGE UP
WBC # BLD: 7.47 K/UL — SIGNIFICANT CHANGE UP (ref 3.8–10.5)
WBC # FLD AUTO: 7.47 K/UL — SIGNIFICANT CHANGE UP (ref 3.8–10.5)
WBC UR QL: SIGNIFICANT CHANGE UP /HPF (ref 0–5)

## 2021-12-16 PROCEDURE — 83605 ASSAY OF LACTIC ACID: CPT

## 2021-12-16 PROCEDURE — 82947 ASSAY GLUCOSE BLOOD QUANT: CPT

## 2021-12-16 PROCEDURE — 0225U NFCT DS DNA&RNA 21 SARSCOV2: CPT

## 2021-12-16 PROCEDURE — 87086 URINE CULTURE/COLONY COUNT: CPT

## 2021-12-16 PROCEDURE — 85014 HEMATOCRIT: CPT

## 2021-12-16 PROCEDURE — 80053 COMPREHEN METABOLIC PANEL: CPT

## 2021-12-16 PROCEDURE — 71045 X-RAY EXAM CHEST 1 VIEW: CPT | Mod: 26

## 2021-12-16 PROCEDURE — 99284 EMERGENCY DEPT VISIT MOD MDM: CPT | Mod: 25

## 2021-12-16 PROCEDURE — 82803 BLOOD GASES ANY COMBINATION: CPT

## 2021-12-16 PROCEDURE — 81001 URINALYSIS AUTO W/SCOPE: CPT

## 2021-12-16 PROCEDURE — 99284 EMERGENCY DEPT VISIT MOD MDM: CPT

## 2021-12-16 PROCEDURE — 82330 ASSAY OF CALCIUM: CPT

## 2021-12-16 PROCEDURE — 85025 COMPLETE CBC W/AUTO DIFF WBC: CPT

## 2021-12-16 PROCEDURE — 87077 CULTURE AEROBIC IDENTIFY: CPT

## 2021-12-16 PROCEDURE — 84295 ASSAY OF SERUM SODIUM: CPT

## 2021-12-16 PROCEDURE — 87040 BLOOD CULTURE FOR BACTERIA: CPT

## 2021-12-16 PROCEDURE — 87186 SC STD MICRODIL/AGAR DIL: CPT

## 2021-12-16 PROCEDURE — 36415 COLL VENOUS BLD VENIPUNCTURE: CPT

## 2021-12-16 PROCEDURE — 96361 HYDRATE IV INFUSION ADD-ON: CPT

## 2021-12-16 PROCEDURE — 84132 ASSAY OF SERUM POTASSIUM: CPT

## 2021-12-16 PROCEDURE — 96360 HYDRATION IV INFUSION INIT: CPT

## 2021-12-16 PROCEDURE — 85018 HEMOGLOBIN: CPT

## 2021-12-16 PROCEDURE — 71045 X-RAY EXAM CHEST 1 VIEW: CPT

## 2021-12-16 PROCEDURE — 82435 ASSAY OF BLOOD CHLORIDE: CPT

## 2021-12-16 RX ORDER — ACETAMINOPHEN 500 MG
650 TABLET ORAL ONCE
Refills: 0 | Status: COMPLETED | OUTPATIENT
Start: 2021-12-16 | End: 2021-12-16

## 2021-12-16 RX ORDER — CEFPODOXIME PROXETIL 100 MG
1 TABLET ORAL
Qty: 14 | Refills: 0
Start: 2021-12-16 | End: 2021-12-22

## 2021-12-16 RX ORDER — CEFPODOXIME PROXETIL 100 MG
100 TABLET ORAL ONCE
Refills: 0 | Status: COMPLETED | OUTPATIENT
Start: 2021-12-16 | End: 2021-12-16

## 2021-12-16 RX ORDER — SODIUM CHLORIDE 9 MG/ML
1000 INJECTION INTRAMUSCULAR; INTRAVENOUS; SUBCUTANEOUS ONCE
Refills: 0 | Status: COMPLETED | OUTPATIENT
Start: 2021-12-16 | End: 2021-12-16

## 2021-12-16 RX ADMIN — Medication 100 MILLIGRAM(S): at 17:19

## 2021-12-16 RX ADMIN — SODIUM CHLORIDE 1000 MILLILITER(S): 9 INJECTION INTRAMUSCULAR; INTRAVENOUS; SUBCUTANEOUS at 17:16

## 2021-12-16 RX ADMIN — Medication 650 MILLIGRAM(S): at 09:56

## 2021-12-16 RX ADMIN — Medication 650 MILLIGRAM(S): at 17:16

## 2021-12-16 RX ADMIN — SODIUM CHLORIDE 1000 MILLILITER(S): 9 INJECTION INTRAMUSCULAR; INTRAVENOUS; SUBCUTANEOUS at 09:56

## 2021-12-16 NOTE — ED ADULT TRIAGE NOTE - CHIEF COMPLAINT QUOTE
sent to ed from home for fever x 2 days took tylenol yesterday and fever went down. hx htn. denies cp denies sob. vaccinated for covid x 3

## 2021-12-16 NOTE — ED ADULT NURSE NOTE - NS_SISCREENINGSR_GEN_ALL_ED
[facility] pharmacy left VM stating Accu-chek bucky test strips need to be sent in with ICD 10 code. Please re-submit Rx.
Re-sent strips w/ Dx code/ICD code.   Agueda Lema
Negative

## 2021-12-16 NOTE — ED PROVIDER NOTE - NS ED ROS FT
Gen: +fever, +decreased PO intake, weakness  ENT:  no sore throat  Resp: No cough, no trouble breathing  Cardiovascular: No chest pain,   Gastrointestinal: No nausea, no vomiting, no diarrhea  :  no dysuria  Skin: No rashes  Neuro: No headache  Remainder negative, except as per the HPI

## 2021-12-16 NOTE — ED PROVIDER NOTE - PHYSICAL EXAMINATION
Gen: no acute distress  Head: normocephalic, atraumatic  EENT: EOMI, moist mucous membranes  Lung: no increased work of breathing, CTABL  CV: normal s1/s2, rrr, 2+ radial pulses b/l  Abd: soft, non-tender, non-distended, no rebound tenderness or guarding  MSK: No edema, no visible deformities, full range of motion in all 4 extremities  Neuro: No focal neurologic deficits, A&Ox3  Skin: No rash   Psych: normal affect

## 2021-12-16 NOTE — ED PROVIDER NOTE - CLINICAL SUMMARY MEDICAL DECISION MAKING FREE TEXT BOX
89yM with pmh htn, s/p prostatectomy, COVID PNA (Dec2020) presenting with 2 days of fever. Will order normal labs, CXR, and urine. Fluid bolus

## 2021-12-16 NOTE — ED PROVIDER NOTE - ATTENDING CONTRIBUTION TO CARE
Avelina: I performed a face to face evaluation of this patient and performed a full history and physical examination on the patient.  I agree with the resident's history, physical examination, and plan of the patient unless otherwise noted. My brief assessment is as follows: pmh as documented with fever at home with decreased po over past 1-2 day. pt denies any complaints, son states son had uri symptoms and noticed fever in father so spoke to doctor who told pt to come in. pt vaccinated against covid x3. denies cp/sob/abd pain, headache or any other symptos. well appering, slightly dry mucus membranes, ctab, rrr, abd benign, neuro intact oriented x3. labs, urine, rvp, hydration. reassess

## 2021-12-16 NOTE — ED PROVIDER NOTE - OBJECTIVE STATEMENT
89yM with pmh htn, s/p prostatectomy, COVID PNA (Dec2020) presenting with 2 days of fever. Patient is A&Ox3. Only complaint is of general weakness. Son noted patient with fatigue, poor PO intake. Was febrile at home yesterday and given Tylenol. Spiked fever today and was instructed by PCP to go to ED. No cough, chest pain, shortness of breath, N/V, or diarrhea per son and patient. Sick contacts includes son who has had a cold. COVID vaccinated x3. Had COVID PNA in Dec2020 but has not required O2 at home. Son reports no DNI/DNR

## 2021-12-16 NOTE — ED PROVIDER NOTE - PROGRESS NOTE DETAILS
Patient with UTI. Sent antibiotics to pharmacy. Discharge plan and return precautions discussed with son over the phone. Will d/c Xiomy Vogel. PGY-1

## 2021-12-16 NOTE — ED PROVIDER NOTE - NSFOLLOWUPINSTRUCTIONS_ED_ALL_ED_FT
1) You were found to have a urine infection  2) Antibiotics have been sent to your pharmacy. Please take as prescribed.   3) SEEK IMMEDIATE MEDICAL CARE IF YOU HAVE ANY OF THE FOLLOWING SYMPTOMS: severe back or abdominal pain, fever, inability to keep fluids or medicine down, dizziness/lightheadedness, or a change in mental status.    Urinary Tract Infection    A urinary tract infection (UTI) is an infection of any part of the urinary tract, which includes the kidneys, ureters, bladder, and urethra. Risk factors include ignoring your need to urinate, wiping back to front if female, being an uncircumcised male, and having diabetes or a weak immune system. Symptoms include frequent urination, pain or burning with urination, foul smelling urine, cloudy urine, pain in the lower abdomen, blood in the urine, and fever. If you were prescribed an antibiotic medicine, take it as told by your health care provider. Do not stop taking the antibiotic even if you start to feel better.

## 2021-12-16 NOTE — ED PROVIDER NOTE - PATIENT PORTAL LINK FT
You can access the FollowMyHealth Patient Portal offered by [facility] by registering at the following website: [email or web address]. By joining [facility]’s FollowMyHealth portal, you will also be able to view your health information using other applications (apps) compatible with our system.

## 2021-12-21 LAB
CULTURE RESULTS: SIGNIFICANT CHANGE UP
CULTURE RESULTS: SIGNIFICANT CHANGE UP
SPECIMEN SOURCE: SIGNIFICANT CHANGE UP
SPECIMEN SOURCE: SIGNIFICANT CHANGE UP

## 2022-02-03 NOTE — ED PROVIDER NOTE - NSTIMEPROVIDERCAREINITIATE_GEN_ER
Quality 226: Preventive Care And Screening: Tobacco Use: Screening And Cessation Intervention: Patient screened for tobacco use, is a smoker AND did not received Cessation Counseling for Unknown Reasons
Quality 110: Preventive Care And Screening: Influenza Immunization: Influenza Immunization previously received during influenza season
Detail Level: Detailed
02-Dec-2020 07:56

## 2022-03-01 NOTE — ED ADULT NURSE NOTE - SUICIDE SCREENING DEPRESSION
Negative Ftsg Text: The defect edges were squared with a #15 scalpel blade.  Given the location of the defect, shape of the defect and the proximity to free margins a full thickness skin graft was deemed most appropriate.  Using a sterile surgical marker, the primary defect shape was transferred to the donor site. The area thus outlined was incised deep to adipose tissue with a #15 scalpel blade.  The harvested graft was then trimmed of adipose tissue until only dermis and epidermis was left.  The skin margins of the secondary defect were undermined to an appropriate distance in all directions utilizing iris scissors.  The secondary defect was closed with interrupted buried subcutaneous sutures.  The skin edges were then re-apposed with running  sutures.  The skin graft was then placed in the primary defect and oriented appropriately.

## 2023-07-18 NOTE — ED ADULT NURSE NOTE - HIV OFFER
----- Message from PARISH Ba sent at 7/18/2023  1:28 PM CDT -----  AAA screening US negative for abdominal aortic aneurysm.  
Pt notified of results and verbalized understanding. Informed him that the results were also faxed to [facility] for their review.  
Previously Declined (within the last year)

## 2023-08-02 NOTE — ED ADULT NURSE NOTE - COVID-19 RESULT
POSITIVE Birth Control Pills Pregnancy And Lactation Text: This medication should be avoided if pregnant and for the first 30 days post-partum.

## 2024-01-13 NOTE — ED ADULT NURSE NOTE - NS ED NURSE RECORD ANOTHER VITAL SIGN
Case Management Assessment            Discharge Note                    Date / Time of Note: 1/13/2024 12:34 PM                  Discharge Note Completed by: Melina Torres RN    Patient Name: Jose Rodríguez   YOB: 1999  Diagnosis: Status epilepticus (HCC) [G40.901]   Date / Time: 1/5/2024  3:16 PM    Current PCP: No primary care provider on file.  Clinic patient: No    Hospitalization in the last 30 days: No       Advance Directives:  Code Status: Full Code  [place] DNR form completed and on chart: Not Indicated    Financial:  Payor: MEDICARE / Plan: MEDICARE PART A AND B / Product Type: *No Product type* /      Pharmacy:    [facility] [address] [facility] [phone] - F [phone]  [address] [address]  Phone: [phone] Fax: [phone]      Assistance purchasing medications?: Potential Assistance Purchasing Medications: No  Assistance provided by Case Management: None at this time    Does patient want to participate in local refill/ meds to beds program?: Not Assessed    Meds To Beds General Rules:  1. Can ONLY be done Monday- Friday between 8:30am-5pm  2. Prescription(s) must be in pharmacy by 3pm to be filled same day  3.Copy of patient's insurance/ prescription drug card and patient face sheet must be sent along with the prescription(s)  4. Cost of Rx cannot be added to hospital bill. If financial assistance is needed, please contact unit  or ;  or  CANNOT provide pharmacy voucher for patients co-pays  5. Patients can then  the prescription on their way out of the hospital at discharge, or pharmacy can deliver to the bedside if staff is available. (payment due at time of pick-up or delivery - cash, check, or card accepted)     Able to afford home medications/ co-pay costs: Yes    ADLS:  Current PT AM-PAC Score:   /24  Current OT AM-PAC Score:   /24      DISCHARGE  Yes

## 2025-03-26 NOTE — SWALLOW BEDSIDE ASSESSMENT ADULT - POSITIONING
Please call patient back, ceftin is resistant on the final culture   Start bactrim instead- sent to [facility] thanks
Pacemaker
upright (90 degrees)
upright (90 degrees)
None/Pacemaker